# Patient Record
Sex: FEMALE | Race: WHITE | Employment: OTHER | URBAN - NONMETROPOLITAN AREA
[De-identification: names, ages, dates, MRNs, and addresses within clinical notes are randomized per-mention and may not be internally consistent; named-entity substitution may affect disease eponyms.]

---

## 2019-11-26 ENCOUNTER — OFFICE VISIT (OUTPATIENT)
Dept: OPTOMETRY | Age: 74
End: 2019-11-26
Payer: MEDICARE

## 2019-11-26 DIAGNOSIS — H52.203 MYOPIA OF BOTH EYES WITH ASTIGMATISM AND PRESBYOPIA: ICD-10-CM

## 2019-11-26 DIAGNOSIS — Z86.69 H/O DIPLOPIA: Primary | ICD-10-CM

## 2019-11-26 DIAGNOSIS — H53.8 BLURRED VISION, BILATERAL: ICD-10-CM

## 2019-11-26 DIAGNOSIS — H25.13 NUCLEAR SCLEROSIS OF BOTH EYES: ICD-10-CM

## 2019-11-26 DIAGNOSIS — H52.13 MYOPIA OF BOTH EYES WITH ASTIGMATISM AND PRESBYOPIA: ICD-10-CM

## 2019-11-26 DIAGNOSIS — H52.4 MYOPIA OF BOTH EYES WITH ASTIGMATISM AND PRESBYOPIA: ICD-10-CM

## 2019-11-26 PROCEDURE — 4040F PNEUMOC VAC/ADMIN/RCVD: CPT | Performed by: OPTOMETRIST

## 2019-11-26 PROCEDURE — G8484 FLU IMMUNIZE NO ADMIN: HCPCS | Performed by: OPTOMETRIST

## 2019-11-26 PROCEDURE — 99203 OFFICE O/P NEW LOW 30 MIN: CPT | Performed by: OPTOMETRIST

## 2019-11-26 PROCEDURE — G8421 BMI NOT CALCULATED: HCPCS | Performed by: OPTOMETRIST

## 2019-11-26 PROCEDURE — 1123F ACP DISCUSS/DSCN MKR DOCD: CPT | Performed by: OPTOMETRIST

## 2019-11-26 PROCEDURE — 1036F TOBACCO NON-USER: CPT | Performed by: OPTOMETRIST

## 2019-11-26 PROCEDURE — G8427 DOCREV CUR MEDS BY ELIG CLIN: HCPCS | Performed by: OPTOMETRIST

## 2019-11-26 PROCEDURE — G8400 PT W/DXA NO RESULTS DOC: HCPCS | Performed by: OPTOMETRIST

## 2019-11-26 PROCEDURE — 1090F PRES/ABSN URINE INCON ASSESS: CPT | Performed by: OPTOMETRIST

## 2019-11-26 RX ORDER — GLIPIZIDE 5 MG/1
TABLET, FILM COATED, EXTENDED RELEASE ORAL
COMMUNITY
Start: 2019-11-03 | End: 2022-11-02

## 2019-11-26 ASSESSMENT — REFRACTION_WEARINGRX
OD_ADD: +2.50
SPECS_TYPE: PAL
OS_CYLINDER: -1.75
OD_SPHERE: -0.75
OS_ADD: +2.50
OD_CYLINDER: -2.00
OS_AXIS: 017
OS_SPHERE: +0.00
OD_AXIS: 174

## 2019-11-26 ASSESSMENT — TONOMETRY
OS_IOP_MMHG: 15
OD_IOP_MMHG: 16
IOP_METHOD: NON-CONTACT AIR PUFF

## 2019-11-26 ASSESSMENT — REFRACTION_MANIFEST
OS_ADD: +2.50
OS_CYLINDER: -1.75
OS_SPHERE: +0.25
OD_AXIS: 174
OS_AXIS: 020
OD_CYLINDER: -2.00
OD_ADD: +2.50
OD_SPHERE: -0.75

## 2019-11-26 ASSESSMENT — SLIT LAMP EXAM - LIDS
COMMENTS: NORMAL
COMMENTS: NORMAL

## 2019-11-26 ASSESSMENT — ENCOUNTER SYMPTOMS
EYES NEGATIVE: 0
GASTROINTESTINAL NEGATIVE: 0
RESPIRATORY NEGATIVE: 0
ALLERGIC/IMMUNOLOGIC NEGATIVE: 0

## 2019-11-26 ASSESSMENT — VISUAL ACUITY
CORRECTION_TYPE: GLASSES
OD_CC: 20/30 OU
OS_CC: 20/40
METHOD: SNELLEN - LINEAR
OS_CC+: -2

## 2020-01-02 ENCOUNTER — OFFICE VISIT (OUTPATIENT)
Dept: OPTOMETRY | Age: 75
End: 2020-01-02
Payer: MEDICARE

## 2020-01-02 PROCEDURE — G8427 DOCREV CUR MEDS BY ELIG CLIN: HCPCS | Performed by: OPTOMETRIST

## 2020-01-02 PROCEDURE — 99213 OFFICE O/P EST LOW 20 MIN: CPT | Performed by: OPTOMETRIST

## 2020-01-02 PROCEDURE — 1090F PRES/ABSN URINE INCON ASSESS: CPT | Performed by: OPTOMETRIST

## 2020-01-02 PROCEDURE — 1036F TOBACCO NON-USER: CPT | Performed by: OPTOMETRIST

## 2020-01-02 PROCEDURE — G8484 FLU IMMUNIZE NO ADMIN: HCPCS | Performed by: OPTOMETRIST

## 2020-01-02 PROCEDURE — 1123F ACP DISCUSS/DSCN MKR DOCD: CPT | Performed by: OPTOMETRIST

## 2020-01-02 PROCEDURE — 92250 FUNDUS PHOTOGRAPHY W/I&R: CPT | Performed by: OPTOMETRIST

## 2020-01-02 PROCEDURE — 4040F PNEUMOC VAC/ADMIN/RCVD: CPT | Performed by: OPTOMETRIST

## 2020-01-02 PROCEDURE — G8421 BMI NOT CALCULATED: HCPCS | Performed by: OPTOMETRIST

## 2020-01-02 PROCEDURE — G8400 PT W/DXA NO RESULTS DOC: HCPCS | Performed by: OPTOMETRIST

## 2020-01-02 RX ORDER — PHENYLEPHRINE HCL 2.5 %
1 DROPS OPHTHALMIC (EYE) ONCE
Status: COMPLETED | OUTPATIENT
Start: 2020-01-02 | End: 2020-01-02

## 2020-01-02 RX ORDER — TROPICAMIDE 10 MG/ML
1 SOLUTION/ DROPS OPHTHALMIC ONCE
Status: COMPLETED | OUTPATIENT
Start: 2020-01-02 | End: 2020-01-02

## 2020-01-02 RX ADMIN — TROPICAMIDE 1 DROP: 10 SOLUTION/ DROPS OPHTHALMIC at 16:27

## 2020-01-02 RX ADMIN — Medication 1 DROP: at 16:26

## 2020-01-02 ASSESSMENT — VISUAL ACUITY
OS_CC+: -1
METHOD: SNELLEN - LINEAR
OD_CC+: -1
OD_CC: 20/30 OU
OS_CC: 20/20
CORRECTION_TYPE: GLASSES

## 2020-01-02 ASSESSMENT — ENCOUNTER SYMPTOMS
ALLERGIC/IMMUNOLOGIC NEGATIVE: 0
RESPIRATORY NEGATIVE: 0
GASTROINTESTINAL NEGATIVE: 0
EYES NEGATIVE: 0

## 2020-01-02 ASSESSMENT — REFRACTION_WEARINGRX
OS_AXIS: 020
SPECS_TYPE: PAL
OD_AXIS: 174
OD_ADD: +2.50
OD_SPHERE: -0.75
OS_ADD: +2.50
OD_CYLINDER: -2.00
OS_SPHERE: +0.25
OS_CYLINDER: -1.75

## 2020-01-02 ASSESSMENT — TONOMETRY
IOP_METHOD: NON-CONTACT AIR PUFF
OS_IOP_MMHG: 15
OD_IOP_MMHG: 11

## 2020-01-02 ASSESSMENT — SLIT LAMP EXAM - LIDS
COMMENTS: NORMAL
COMMENTS: NORMAL

## 2020-01-02 NOTE — PROGRESS NOTES
Transportation needs:     Medical: None     Non-medical: None   Tobacco Use    Smoking status: Never Smoker    Smokeless tobacco: Never Used   Substance and Sexual Activity    Alcohol use: None    Drug use: None    Sexual activity: None   Lifestyle    Physical activity:     Days per week: None     Minutes per session: None    Stress: None   Relationships    Social connections:     Talks on phone: None     Gets together: None     Attends [employer] service: None     Active member of club or organization: None     Attends meetings of clubs or organizations: None     Relationship status: None    Intimate partner violence:     Fear of current or ex partner: None     Emotionally abused: None     Physically abused: None     Forced sexual activity: None   Other Topics Concern    None   Social History Narrative    None       Past Medical History:   Diagnosis Date    Hypertension     Type II or unspecified type diabetes mellitus without mention of complication, not stated as uncontrolled          Main Ophthalmology Exam     External Exam       Right Left    External Normal Normal          Slit Lamp Exam       Right Left    Lids/Lashes Normal Normal    Conjunctiva/Sclera White and quiet White and quiet    Cornea Clear Clear    Anterior Chamber Deep and quiet Deep and quiet    Iris Round and reactive Round and reactive    Lens 2+ Nuclear sclerosis 2+ Nuclear sclerosis    Vitreous Central vitreous floaters Central vitreous floaters          Fundus Exam       Right Left    Disc Normal Normal    C/D Ratio 0.3 0.3    Macula Normal Normal    Vessels Normal Normal                  Tonometry     Tonometry (Non-contact air puff, 3:53 PM)       Right Left    Pressure 11 15   IOP.2             12.9  CH:  11.3          9.6  WS: 9.0          9.3                   Visual Acuity (Snellen - Linear)       Right Left    Dist cc 20/20 -1 20/20 -1    Near cc 20/30 OU     Correction:  Glasses         Not recorded         Not

## 2021-03-03 ENCOUNTER — OFFICE VISIT (OUTPATIENT)
Dept: NEUROLOGY | Age: 76
End: 2021-03-03
Payer: MEDICARE

## 2021-03-03 VITALS
HEIGHT: 67 IN | HEART RATE: 66 BPM | DIASTOLIC BLOOD PRESSURE: 80 MMHG | WEIGHT: 227 LBS | OXYGEN SATURATION: 97 % | BODY MASS INDEX: 35.63 KG/M2 | SYSTOLIC BLOOD PRESSURE: 118 MMHG

## 2021-03-03 DIAGNOSIS — Z79.01 CURRENT USE OF LONG TERM ANTICOAGULATION: ICD-10-CM

## 2021-03-03 DIAGNOSIS — E11.3299 BACKGROUND DIABETIC RETINOPATHY (HCC): ICD-10-CM

## 2021-03-03 DIAGNOSIS — H52.203 MYOPIA OF BOTH EYES WITH ASTIGMATISM AND PRESBYOPIA: ICD-10-CM

## 2021-03-03 DIAGNOSIS — I48.91 ATRIAL FIBRILLATION, UNSPECIFIED TYPE (HCC): ICD-10-CM

## 2021-03-03 DIAGNOSIS — I67.82 CEREBRAL ISCHEMIA: ICD-10-CM

## 2021-03-03 DIAGNOSIS — G62.9 PERIPHERAL POLYNEUROPATHY: ICD-10-CM

## 2021-03-03 DIAGNOSIS — R42 DIZZINESS: Primary | ICD-10-CM

## 2021-03-03 DIAGNOSIS — E11.49 CONTROLLED TYPE 2 DIABETES MELLITUS WITH OTHER NEUROLOGIC COMPLICATION, WITHOUT LONG-TERM CURRENT USE OF INSULIN (HCC): ICD-10-CM

## 2021-03-03 DIAGNOSIS — R26.89 BALANCE PROBLEM: ICD-10-CM

## 2021-03-03 DIAGNOSIS — R42 VERTIGO: ICD-10-CM

## 2021-03-03 DIAGNOSIS — H52.13 MYOPIA OF BOTH EYES WITH ASTIGMATISM AND PRESBYOPIA: ICD-10-CM

## 2021-03-03 DIAGNOSIS — H52.4 MYOPIA OF BOTH EYES WITH ASTIGMATISM AND PRESBYOPIA: ICD-10-CM

## 2021-03-03 PROBLEM — E11.9 DIABETES MELLITUS TYPE II, CONTROLLED (HCC): Status: ACTIVE | Noted: 2021-03-03

## 2021-03-03 PROCEDURE — 99214 OFFICE O/P EST MOD 30 MIN: CPT | Performed by: PSYCHIATRY & NEUROLOGY

## 2021-03-03 PROCEDURE — G8484 FLU IMMUNIZE NO ADMIN: HCPCS | Performed by: PSYCHIATRY & NEUROLOGY

## 2021-03-03 PROCEDURE — 99205 OFFICE O/P NEW HI 60 MIN: CPT | Performed by: PSYCHIATRY & NEUROLOGY

## 2021-03-03 PROCEDURE — G8427 DOCREV CUR MEDS BY ELIG CLIN: HCPCS | Performed by: PSYCHIATRY & NEUROLOGY

## 2021-03-03 PROCEDURE — 1090F PRES/ABSN URINE INCON ASSESS: CPT | Performed by: PSYCHIATRY & NEUROLOGY

## 2021-03-03 PROCEDURE — G8417 CALC BMI ABV UP PARAM F/U: HCPCS | Performed by: PSYCHIATRY & NEUROLOGY

## 2021-03-03 RX ORDER — LORAZEPAM 1 MG/1
TABLET ORAL
Qty: 2 TABLET | Refills: 0 | Status: SHIPPED | OUTPATIENT
Start: 2021-03-03 | End: 2021-04-03

## 2021-03-03 RX ORDER — WARFARIN SODIUM 2 MG/1
2 TABLET ORAL
COMMUNITY

## 2021-03-03 ASSESSMENT — ENCOUNTER SYMPTOMS
VOICE CHANGE: 0
EYE PAIN: 0
CONSTIPATION: 0
EYE ITCHING: 0
EYE REDNESS: 0
PHOTOPHOBIA: 0
SHORTNESS OF BREATH: 0
DIARRHEA: 0
CHANGE IN BOWEL HABIT: 0
FACIAL SWELLING: 0
SORE THROAT: 0
VISUAL CHANGE: 0
NAUSEA: 0
VOMITING: 0
SINUS PRESSURE: 0
APNEA: 0
CHOKING: 0
ABDOMINAL PAIN: 0
BLOOD IN STOOL: 0
EYE DISCHARGE: 0
COUGH: 0
SWOLLEN GLANDS: 0
WHEEZING: 0
ABDOMINAL DISTENTION: 0
BOWEL INCONTINENCE: 0
COLOR CHANGE: 0
TROUBLE SWALLOWING: 0
CHEST TIGHTNESS: 0
BACK PAIN: 0

## 2021-03-03 NOTE — PROGRESS NOTES
[facility]  Neurology    [address]ZQU:[phone]   Fax: [phone]        SUBJECTIVE:       PATIENT ID:  Irasema Machado is a  RIGHT    HANDED 64 y.o. female. Dizziness  This is a recurrent problem. Episode onset: SINCE   FEB. 2021. The problem occurs intermittently. The problem has been waxing and waning. Associated symptoms include numbness and vertigo. Pertinent negatives include no abdominal pain, anorexia, arthralgias, change in bowel habit, chest pain, chills, congestion, coughing, diaphoresis, fatigue, fever, headaches, joint swelling, myalgias, nausea, neck pain, rash, sore throat, swollen glands, urinary symptoms, visual change, vomiting or weakness. Nothing aggravates the symptoms. She has tried rest and sleep for the symptoms. The treatment provided moderate relief. Neurologic Problem  The patient's primary symptoms include clumsiness, focal sensory loss and a loss of balance. The patient's pertinent negatives include no altered mental status, focal weakness, memory loss, near-syncope, syncope, visual change or weakness. Primary symptoms comment: H/O   VERTIGO    IN     2019  . This is a chronic problem. The neurological problem developed insidiously. The problem is unchanged. There was lower extremity, left-sided and right-sided focality noted. Associated symptoms include dizziness, light-headedness and vertigo. Pertinent negatives include no abdominal pain, auditory change, aura, back pain, bladder incontinence, bowel incontinence, chest pain, confusion, diaphoresis, fatigue, fever, headaches, nausea, neck pain, palpitations, shortness of breath or vomiting. Past treatments include bed rest and sleep. The treatment provided no relief. There is no history of a bleeding disorder, a clotting disorder, a CVA, dementia, head trauma, liver disease, mood changes or seizures.              History obtained from  The patient     and other  available CORRECTABLE   ETIOLOGIES;     AND                              PATIENT  REQUESTS   THE  SAME. 9)       VARIOUS  RISK   FACTORS   WERE  REVIEWED   AND   DISCUSSED. PATIENT   HAS  MULTIPLE   MEDICAL, NEUROLOGICAL     PROBLEMS . PATIENT'S   MANAGEMENT  IS  CHALLENGING.                                             PRECIPITATING  FACTORS: including  fever/infection, exertion/relaxation, position change, stress,                weather change,   medications/alcohol, time of day/darkness/light  Are  absent                                                          MODIFYING  FACTORS:  fever/infection, exertion/relaxation, position change, stress, weather change,               medications/alcohol, time of day/darkness/light  Are  absent                Patient   Indicates   The  Presence   And  The  Absence  Of  The  Following    Associated  And             Additional  Neurological    Symptoms:                                Balance  And coordination   problems  present           Gait problems     absent            Headaches      absent              Migraines           absent           Memory problemsabsent              Confusion        absent            Paresthesia   numbness          present           Seizures  And  Starring  Episodes           absent           Syncope,  Near  syncopal episodes         absent           Speech   problems           absent             Swallowing   Problems      absent            Dizziness,  Light headedness           present              Vertigo        present             Generalized   Weakness    absent              focal  Weakness     absent             Tremors         absent              Sleep  Problems     absent             History  Of   Recent  Head  Injury     absent             History  Of   Recent  TIA     absent             History  Of   Recent    Stroke     absent             Neck  Pain   and   Neck muscle  Spasms  absent               Radiating  down   And   Weakness           absent            Lower back   Pain  And     Spasms  absent              Radiating    Down   And   Weakness          absent                H/OFALLS        absent               History  Of   Visual  Symptoms    absent                  Associated   Diplopia       absent                                               Also   Additional   Symptoms   Present    As  Documented    In   The   detailed                  Review  Of  Systems   And    Please   Refer   To    Them for   Additional    Information. Any components  That are either  Unobtainable  Or  Limited  In   HPI, ROS  And/or PFSH   Are                   Due   ToPatient's  Medical  Problems,  Clinical  Condition   and/or lack of                                 other    Alternate   resources. RECORDS   REVIEWED:    historical medical records           INFORMATION   REVIEWED:     MEDICAL   HISTORY,SURGICAL   HISTORY,     MEDICATIONS   LIST,   ALLERGIES AND  DRUG  INTOLERANCES,       FAMILY   HISTORY,  SOCIAL  HISTORY,      PROBLEM  LIST   FOR  PATIENT  CARE   COORDINATION          Past Medical History:   Diagnosis Date    Hypertension     Type II or unspecified type diabetes mellitus without mention of complication, not stated as uncontrolled          History reviewed. No pertinent surgical history.       Current Outpatient Medications   Medication Sig Dispense Refill    warfarin (COUMADIN) 2 MG tablet Take 2 mg by mouth As directed      LORazepam (ATIVAN) 1 MG tablet Take 1 to 2 tabs PO PRN 1 Hour prior to procedure    NO    DRIVING   AFTER  TAKING    MEDICATION 2 tablet 0    glipiZIDE (GLUCOTROL XL) 5 MG extended release tablet       carvedilol (COREG) 6.25 MG tablet Take by mouth 2 times daily       famotidine (PEPCID) 20 MG tablet Take 20 mg by mouth 2 times daily       furosemide (LASIX) 20 MG tablet Take 20 mg by mouth daily       metFORMIN (GLUCOPHAGE) 1000 MG tablet Take 1,000 mg by mouth 2 times daily (with meals)       spironolactone (ALDACTONE) 25 MG tablet Take 25 mg by mouth daily       warfarin (COUMADIN) 2.5 MG tablet Take 2.5 mg by mouth daily \"6 days a week\"      digoxin (LANOXIN) 250 MCG tablet Take 250 mcg by mouth daily \"5 times a week, skipping 2 days\"       No current facility-administered medications for this visit.           No Known Allergies      Family History   Problem Relation Age of Onset    Other Mother         detached retina ou    Glaucoma Neg Hx     Cataracts Neg Hx     Diabetes Neg Hx          Social History     Socioeconomic History    Marital status:      Spouse name: Not on file    Number of children: Not on file    Years of education: Not on file    Highest education level: Not on file   Occupational History    Not on file   Social Needs    Financial resource strain: Not on file    Food insecurity     Worry: Not on file     Inability: Not on file    Transportation needs     Medical: Not on file     Non-medical: Not on file   Tobacco Use    Smoking status: Never Smoker    Smokeless tobacco: Never Used   Substance and Sexual Activity    Alcohol use: Not on file    Drug use: Not on file    Sexual activity: Not on file   Lifestyle    Physical activity     Days per week: Not on file     Minutes per session: Not on file    Stress: Not on file   Relationships    Social connections     Talks on phone: Not on file     Gets together: Not on file     Attends Christianity service: Not on file     Active member of club or organization: Not on file     Attends meetings of clubs or organizations: Not on file     Relationship status: Not on file    Intimate partner violence     Fear of current or ex partner: Not on file     Emotionally abused: Not on file     Physically abused: Not on file     Forced sexual activity: Not on file   Other Topics Concern    Not on file   Social History Narrative    Not on file Vitals:    03/03/21 0950   BP: 118/80   Pulse: 66   SpO2: 97%         Wt Readings from Last 3 Encounters:   03/03/21 227 lb (103 kg)         BP Readings from Last 3 Encounters:   03/03/21 118/80             Review of Systems   Constitutional: Negative for appetite change, chills, diaphoresis, fatigue, fever and unexpected weight change. HENT: Negative for congestion, dental problem, drooling, ear discharge, ear pain, facial swelling, hearing loss, mouth sores, nosebleeds, postnasal drip, sinus pressure, sore throat, tinnitus, trouble swallowing and voice change. Eyes: Negative for photophobia, pain, discharge, redness, itching and visual disturbance. Respiratory: Negative for apnea, cough, choking, chest tightness, shortness of breath and wheezing. Cardiovascular: Negative for chest pain, palpitations, leg swelling and near-syncope. Gastrointestinal: Negative for abdominal distention, abdominal pain, anorexia, blood in stool, bowel incontinence, change in bowel habit, constipation, diarrhea, nausea and vomiting. Endocrine: Negative for cold intolerance, heat intolerance, polydipsia, polyphagia and polyuria. Genitourinary: Negative for bladder incontinence. Musculoskeletal: Negative for arthralgias, back pain, gait problem, joint swelling, myalgias, neck pain and neck stiffness. Skin: Negative for color change, pallor, rash and wound. Allergic/Immunologic: Negative for environmental allergies, food allergies and immunocompromised state. Neurological: Positive for dizziness, vertigo, light-headedness, numbness and loss of balance. Negative for tremors, focal weakness, seizures, syncope, facial asymmetry, speech difficulty, weakness and headaches. Hematological: Negative for adenopathy. Does not bruise/bleed easily.    Psychiatric/Behavioral: Negative for agitation, behavioral problems, confusion, decreased concentration, dysphoric mood, hallucinations, memory loss, self-injury, sleep MENTAL STATUS:                   Alert and  oriented  To time, place  And  Person. No Aphasia. No  Dysarthria. Able   To  Follow     SIMPLE    commands   without   Any  Difficulty. No right  To left confusion. Normal  Speech  And language function. Insight and  Judgment ,Fund  Of  Knowledge   within normal limits                Recent  And  Remote memory  within   normal limits                Attention &  Concentration are within   normal limits                                                   B) CRANIAL NERVES :        CN : Visual  Acuity  And  Visual fields  within normal limits               Fundi  Could  Not  Be  Could  Not  Be  Evaluated. 3,4,6 CN : Both  Pupils are   Reactive and  Equal.  Movements  Are  Intact. No  Nystagmus. No  BERTO. No  Afferent  Pupillary  Defect noted. 5 CN :  Normal  Facial sensations and Corneal  Reflexes           7 CN:  Normal  Facial  Symmetry  And  Strength. No facial  Weakness. 8 CN :  Hearing  Appears within normal limits          9, 10 CN: Normal   spontaneous, reflex   palate   movements         11 CN:   Normal  Shoulder  shrug and  strength         12 CN :   Normal  Tongue movements and  Tongue  In midline                        No tongue   Fasciculations or atrophy       C) MOTOR  EXAM:                 Strength  In upper  And  Lower   extremities   within   normal limits               No  Drift. No  Atrophy               Rapid   alternating  And  repetitions  Movements  within   normal limits                 Muscle  Tone  In upper  And  Lower  Extremities  normal                No rigidity. No  Spasticity. Bradykinesia   absent                 No  Asterixis.               Sustention  Tremor , Resting   Tremor   absent                    No   other  Abnormal  Movements noted           D) SENSORY : Light   touch, pinprick,   position  And  Vibration  DECREASED         E) REFLEXES:                   Deep  Tendon  Reflexes      DECREASED                    No  pathological  Reflexes  Bilaterally. F) COORDINATION  AND  GAIT :                                Station and  Gait     SLOW                             Romberg 's test    POSITIVE                            Ataxia negative      ASSESSMENT:        Patient Active Problem List   Diagnosis    Background [facility] [address]    Cataract    Myopia of both eyes with astigmatism and presbyopia    Dizziness    Vertigo    Balance problem    Peripheral polyneuropathy    Diabetes mellitus type II, controlled ([address]    Atrial fibrillation ([address]    Current use of long term anticoagulation    Cerebral ischemia         VISITING DIAGNOSIS:      ICD-10-CM    1. Dizziness  R42 LORazepam (ATIVAN) 1 MG tablet   2. Background diabetic retinopathy ([address]  E11.3299 EEG     MRI BRAIN WO CONTRAST     VL DUP CAROTID BILATERAL     VL TRANSCRANIAL DOPPLER COMPLETE     [facility], [place]     LORazepam (ATIVAN) 1 MG tablet   3. Myopia of both eyes with astigmatism and presbyopia  H52.13 EEG    H52.203 MRI BRAIN WO CONTRAST    H52.4 VL DUP CAROTID BILATERAL     VL TRANSCRANIAL [address] [facility], [place]     LORazepam (ATIVAN) 1 MG tablet   4. Vertigo  R42 LORazepam (ATIVAN) 1 MG tablet   5. Balance problem  R26.89 LORazepam (ATIVAN) 1 MG tablet   6. Peripheral polyneuropathy  G62.9 LORazepam (ATIVAN) 1 MG tablet   7. Controlled type 2 diabetes mellitus with other neurologic complication, without long-term current use of insulin ([facility])  E11.49 LORazepam (ATIVAN) 1 MG tablet   8. Atrial fibrillation, unspecified type ([address]  I48.91    9. Current use of long term anticoagulation  Z79.01    10.  Cerebral ischemia  I67.82               CONCERNS   &   INCREASED   RISK   FOR         * TIA, CEREBRO  VASCULAR  ISCHEMIA, STROKE     *   DIZZINESS,   VERTEBROBASILAR  INSUFFICIENCY ,       *   ORTHOSTATIC  INTOLERANCE,    AUTONOMIC  NEUROPATHY        *   PERIPHERAL  NEUROPATHY      *     BALANCE PROBLMES   AND  FALL                VARIOUS  RISK   FACTORS   WERE  REVIEWED   AND   DISCUSSED. *  PATIENT   HAS  MULTIPLE   MEDICAL, NEUROLOGICAL    PROBLEMS            PATIENT'S   MANAGEMENT  IS  CHALLENGING. PLAN:                         Herlinda Max  Of  The  Diagnoses,  The  Management & Treatment  Options            AND    Care  plan  Were          Reviewed and   Discussed   With  patient. * Goals  And  Expectations  Of  The  Therapy  Discussed   And  Reviewed. *   Benefits   And   Side  Effect  Profile  Of  Medication/s   Were   Discussed                * Need   For  Further   Follow up For  The  Various  Problems Were  discussed. * Results  Of  The  Previous  Diagnostic tests were reviewed and  discussed                   Medical  Decision  Making  Was  Made  Based on the   Complexity  Of  Above  Mentioned  Diagnoses,    Data reviewed             And    Risk  Of  Significant   Co morbidities and   complicating   Factors. Medical  Decision  Was   High    Complexity   Due   To  The  Patient's  Multiple  Symptoms,  Advancing   Disease,            Complex  Treatment  Regimen,  Multiple medications           and   Risk  Of   Side  Effects,  Difficulty  In  Medication  Management  And  Diagnostic  Challenges           In  View  Of  The  Associated   Co  Morbid  Conditions   And  Problems. * FALL   PRECAUTIONS. THESE  REVIEWED   AND  DISCUSSED          *   BE  CAREFUL  WITH  ACTIVITIES   INCLUDING  DRIVING. *   ADEQUATE   FLUID  INTAKE   AND  ELECTROLYTE  BALANCE           * KEEP  DAIRY  OF   THE  NEUROLOGICAL  SYMPTOMS        RECORDING THE    DURATION  AND  FREQUENCY.           *  AVOID    CONDITIONS  AND  FACTORS THAT  MAKE                  NEUROLOGICAL  SYMPTOMS  WORSE.                   *TO  MAINTAIN  REGULAR  SLEEP  WAKE  CYCLES. *   TO  HAVE  ADEQUATE  REST  AND   SLEEP    HOURS.              *    AVOID  ANY USAGE OF    TOBACCO,          AVOID  EXCESSIVE  ALCOHOL  AND   ILLEGAL   SUBSTANCES              *  CONTINUE   MEDICATIONS    PRESCRIBED       AS    RECOMMENDED       *   Compliance   With  Medications   And  Instructions          *    Prophylactic  Use   Of     Vitamin   B   Complex,  Folic  Acid,    Vitamin  B12    Multivitamin,       Calcium  With  magnesium  And  Vit D    Supplementations   Over  The  Counter  Discussed             *FOOT  CARE, DAILY  INSPECTION  OF  FEET   AND         PERIODIC  PODIATRY EVALUATIONS . *  EVALUATIONS  AND  FOLLOW UP:                   * PHYSICAL  THERAPY                                  *CARDIOLOGY              *   OPTHALMOLOGY                                                           *   IN  VIEW  OF  THE  PATIENT'S    MULTIPLE   NEUROLOGICAL                           SYMPTOMS  AND  CONCERNS    FOR  PROLONGED   DURATION,                           AND    MULTIPLE   CO MORBID  MEDICAL  CONDITIONS,                           PATIENT    NEEDS  NEURO  DIAGNOSTIC  EVALUATIONS                      FOR   ANY   NEUROLOGICAL  PATHOLOGIES    AND  OTHER                        CORRECTABLE   ETIOLOGIES;     AND                              PATIENT  REQUESTS   THE  SAME.                                            Orders Placed This Encounter   Procedures    MRI BRAIN WO CONTRAST    VL DUP CAROTID BILATERAL    VL TRANSCRANIAL DOPPLER COMPLETE    [facility] [place]    EEG                             Orders Placed This Encounter   Medications    LORazepam (ATIVAN) 1 MG tablet     Sig: Take 1 to 2 tabs PO PRN 1 Hour prior to procedure    NO    DRIVING   AFTER  TAKING    MEDICATION     Dispense:  2 tablet     Refill:  0                             * PATIENT  TO  RETURN  THE  CLINIC  AFTER   ABOVE,                       FOR   FURTHER    RE EVALUATIONS                               AND  ADDITIONAL  RECOMMENDATIONS ,                        INCLUDING  MEDICAL  MANAGEMENT,                        AS   CLINICALLY    INDICATED. *PATIENT   TO  FOLLOW  UP  WITH   PRIMARY  CARE         OTHER  CONSULTANTS  AS  BEFORE. *  Maintain   Healthy  Life Style    With   Periodic  Monitoring  Of          Any  Medical  Conditions  Including   Elevated  Blood  Pressure,  Lipid  Profile,        Blood  Sugar levels  AndHeart  Disease. *   Period   Screening  For  Cancers  Involving  Breast,  Colon,         Lungs  And  Other  Organs  As  Applicable,  In consultation   With  Your  Primary Care Providers. *Second  Neurological  Opinion  And  Evaluations  In  [facility] AND [facility]  Setting  If  Patient  Is  Interested. * Please   Contact   Neurology  Clinic   Early   If   Are  Any  New  Neurological   And  Any neurological  Concerns. *  If  The  Patient remains  Neurologically  Stable   Return   To  [facility] Neurology Department   IN     1-2      MONTHS  TIME   FOR  FURTHER              FOLLOW UP.                       *   The  Neurological   Findings,  Possible  Diagnosis,  Differential diagnoses                    And  Options  For    Further   Investigations                   And  management   Are  Discussed  Comprehensively. Medications   And  Prescription   Risks  And  Side effects  Are   Also  Discussed. *  If   There is  Any  Significant  Worsening   Of  Current  Symptoms  And  Or                  If patient  Develops   Any additional  New  NeurologicalSymptoms                  Or  Significant  Concerns   Should  Call  911 or  Go  To  Emergency  Department  For  Further  Immediate  Evaluation.                          The Above  Were  Reviewed  With  patient   and                       questions  Answered  In  Detail. More   Than  50% of face  To face Time   Was  Spent  On  Counseling                    And   Coordination  Of  Care   Of   Patient's  multiple   Neurological  Problems                         And   Comorbid  Medical   Conditions. Electronically signed by   Casey Espinoza M.D., Romi Rios. Board Certified in  Neurology &  In  [facility] ([facility])      DISCLAIMER:   Although every effort was made to ensure the accuracy of this  electronictranscription, some errors in transcription may have occurred. GENERAL PATIENT INSTRUCTIONS:      A Healthy Lifestyle: Care Instructions   Your Care Instructions   A healthy lifestyle can help you feel good, stay at ahealthy weight, and have plenty of energy for both work and play. A healthy lifestyle is something you can share with your whole family.  A healthy lifestyle also can lower your risk for serious health problems, such ashigh blood pressure, heart disease, and diabetes.  You can follow a few steps listed below to improve your health and the health of your family.  Follow-up careis a key part of your treatment and safety. Be sure to make and go to all appointments, and call your doctor if you are having problems. Its also a good idea to know your test results and keep a list of the medicines you take.  How can you care for yourself at home?  Do not eat too much sugar, fat, or fast foods. You can still have dessert and treats nowand then. The goal is moderation.  Start small to improve your eating habits. Pay attention to portion sizes, drink less juice and soda pop, and eat more fruits and vegetables.  Eat a healthy amount of food. A 3-ounce serving of meat, for example, is about the size of a deck of cards.  Fill the rest of your plate with vegetables and whole grains.  Limit theamount of soda and sports drinks you have every day. Drink more water when you are thirsty.  Eat at least 5 servings of fruits and vegetables every day. It may seem like a lot, but it is not hard to reach this goal. Aserving or helping is 1 piece of fruit, 1 cup of vegetables, or 2 cups of leafy, raw vegetables. Have an apple or some carrot sticks as an afternoon snack instead of a candy bar. Try to have fruits and/or vegetables at everymeal.   Make exercise part of your daily routine. You may want to start with simple activities, such as walking, bicycling, or slow swimming. Try eddie active 30 to 60 minutes every day. You do not need to do all 30 to 60 minutes all at once. For example, you can exercise 3 times a day for 10 or 20 minutes. Moderate exercise is safe for most people, but it is always agood idea to talk to your doctor before starting an exercise program.   Keep moving. Teryl Camper the lawn, work in the garden, or [employer]. Take the stairs instead of the elevator at work.  If you smoke, quit. Peoplewho smoke have an increased risk for heart attack, stroke, cancer, and other lung illnesses. Quitting is hard, but there are ways to boost your chance of quitting tobacco for good.  Use nicotine gum, patches, or lozenges.  Ask your doctor about stop-smoking programs and medicines.  Keep trying.  In addition to reducing your risk of diseases in the future, you will notice some benefits soon after you stop using tobacco. If you have shortness of breath or asthma symptoms, they will likely getbetter within a few weeks after you quit.  Limit how much alcohol you drink. Moderate amounts of alcohol (up to 2 drinks a day for men, 1drink a day for women) are okay. But drinking too much can lead to liver problems, high blood pressure, and other health problems.  health   If you have a family, there are many things you can do together to improve your health.    Eat meals together as a family as often as possible.  Eat healthy foods. This includes fruits, vegetables, lean meats and dairy, and whole grains.  Include your family in your fitness plan. Most peoplethink of activities such as jogging or tennis as the way to fitness, but there are many ways you and your family can be more active. Anything that makes you breathe hard and gets your heart pumping is exercise. Here are sometips:   Walk to do errands or to take your child to school or the bus.  Go for a family bike ride after dinner instead of watching TV.  Where can you learn more?  Go toSTACK [email or web address]. org and sign in to your [employer] account. Enter G912 in the Search HealthInformation box to learn more about \"A Healthy Lifestyle: Care Instructions. \"     If you do not have anaccount, please click on the \"Sign Up Now\" link.  Current as of: July 26, 2016   Content Version: 11.2   © 3466-9527 [employer]. Care instructions adapted under license by [facility] ([facility]). If you have questions about a medical condition or this instruction, always ask your healthcare professional. [employer] disclaims any warranty or liability for your use of this information.

## 2021-03-03 NOTE — PROGRESS NOTES
This writer contacted scheduling to schedule the following testing: EEG, MRI Brain w/o contrast, Carotid Doppler and TCD - patient voiced understanding, will contact office with further needs.

## 2021-03-10 ENCOUNTER — HOSPITAL ENCOUNTER (OUTPATIENT)
Dept: MRI IMAGING | Age: 76
Discharge: HOME | End: 2021-03-12
Payer: MEDICARE

## 2021-03-10 ENCOUNTER — HOSPITAL ENCOUNTER (OUTPATIENT)
Dept: NEUROLOGY | Age: 76
Discharge: HOME | End: 2021-03-10
Payer: MEDICARE

## 2021-03-10 ENCOUNTER — HOSPITAL ENCOUNTER (OUTPATIENT)
Dept: INTERVENTIONAL RADIOLOGY/VASCULAR | Age: 76
Discharge: HOME | End: 2021-03-12
Payer: MEDICARE

## 2021-03-10 DIAGNOSIS — H52.203 MYOPIA OF BOTH EYES WITH ASTIGMATISM AND PRESBYOPIA: ICD-10-CM

## 2021-03-10 DIAGNOSIS — E11.49 CONTROLLED TYPE 2 DIABETES MELLITUS WITH OTHER NEUROLOGIC COMPLICATION, WITHOUT LONG-TERM CURRENT USE OF INSULIN (HCC): ICD-10-CM

## 2021-03-10 DIAGNOSIS — H52.13 MYOPIA OF BOTH EYES WITH ASTIGMATISM AND PRESBYOPIA: ICD-10-CM

## 2021-03-10 DIAGNOSIS — R26.89 BALANCE PROBLEM: ICD-10-CM

## 2021-03-10 DIAGNOSIS — E11.3299 BACKGROUND DIABETIC RETINOPATHY (HCC): ICD-10-CM

## 2021-03-10 DIAGNOSIS — H52.4 MYOPIA OF BOTH EYES WITH ASTIGMATISM AND PRESBYOPIA: ICD-10-CM

## 2021-03-10 DIAGNOSIS — I65.23 BILATERAL CAROTID ARTERY STENOSIS: ICD-10-CM

## 2021-03-10 DIAGNOSIS — G62.9 PERIPHERAL POLYNEUROPATHY: Primary | ICD-10-CM

## 2021-03-10 DIAGNOSIS — G62.9 PERIPHERAL POLYNEUROPATHY: ICD-10-CM

## 2021-03-10 DIAGNOSIS — R42 VERTIGO: ICD-10-CM

## 2021-03-10 DIAGNOSIS — R42 DIZZINESS: ICD-10-CM

## 2021-03-10 DIAGNOSIS — I48.91 ATRIAL FIBRILLATION, UNSPECIFIED TYPE (HCC): ICD-10-CM

## 2021-03-10 DIAGNOSIS — Z79.01 CURRENT USE OF LONG TERM ANTICOAGULATION: ICD-10-CM

## 2021-03-10 DIAGNOSIS — I67.9: ICD-10-CM

## 2021-03-10 DIAGNOSIS — G93.89 BRAIN DYSFUNCTION: ICD-10-CM

## 2021-03-10 DIAGNOSIS — I67.82 CEREBRAL ISCHEMIA: ICD-10-CM

## 2021-03-10 PROCEDURE — 95813 EEG EXTND MNTR 61-119 MIN: CPT

## 2021-03-10 PROCEDURE — 93886 INTRACRANIAL COMPLETE STUDY: CPT

## 2021-03-10 PROCEDURE — 93880 EXTRACRANIAL BILAT STUDY: CPT

## 2021-03-10 PROCEDURE — 70551 MRI BRAIN STEM W/O DYE: CPT

## 2021-03-10 PROCEDURE — 95957 EEG DIGITAL ANALYSIS: CPT

## 2021-03-10 NOTE — PROGRESS NOTES
EXTENDED EEG Completed with Dereje Analysis. PCP: Erica Ross MD    Ordering: Cas Goodwin Neurologist    Interpreting Physician: Alyssa Britton Neurologist    Technician: Rodger Lynn RN

## 2021-03-10 NOTE — PROGRESS NOTES
TCD Completed without Emboli Detection. PCP: Elsie Hankins MD    Ordering: Leila Espinoza. Christa Neurologist    Interpreting Physician: Leila Espinoza.  Meena Goodwin    Electronically signed by Kane Palmer RN on 3/10/2021 at 10:39 AM

## 2021-03-11 NOTE — PROCEDURES
[address] [place] [id number]                               Transcranial Doppler (TCD)        PATIENT NAME: Carl Camargo                       :        1945  MED REC NO:   [id number]                             ROOM:  ACCOUNT NO:   [de-identified]                           ADMIT DATE: 03/10/2021      PROVIDER:     Keisha Tirado MD, F.A. A. N    DATE OF STUDY:  03/10/2021        TECHNIQUE:  Transcranial Doppler study of intracranial arteries was  performed using [employer] equipment with digital Doppler technology, with  high resolution 250 Tolono M-mode display and Multigate Spectral Windows  and 2 MHz Doppler probes via temporal, suboccipital and orbital  approaches. CLINICAL DATA:        The patient is 64years old with a history of vertigo,  dizziness, balance problem, type 2 diabetes, atrial fibrillation,  cerebral ischemia. The purpose of the study is to evaluate for stroke, intracranial focal  stenosis, flow abnormalities, vertebrobasilar insufficiency evaluations. SUMMARY:       Anterior circulation could not be evaluated due to the absence  of the corresponding temporal windows bilaterally. Mean flow velocities in the right and left vertebral arteries and  basilar artery are low with elevated PI values. IMPRESSION:        1. Moderate vertebrobasilar stenosis. 2.  Anterior circulation could not be evaluated due to the absence        of the corresponding temporal windows bilaterally. Further clinical correlation and followup recommended. César Avendano MD, [facility]     Board Certified in Neurology  & in  Farhana Gonzalez [facility] ([facility]).       D: 2021 14:45:48       T: 2021 15:10:41     [id number]  Job#: [id number]     Doc#: [id number]    CC:    Carmella Keene MD (PCP)

## 2021-03-11 NOTE — PROCEDURES
[address] [address]                 [address] [place] [id number]                           ELECTROENCEPHALOGRAM (EEG) REPORT        PATIENT NAME: Jahaira Wilder                       :        1945  MED REC NO:   [id number]                             ROOM:  ACCOUNT NO:   [de-identified]                           ADMIT DATE: 03/10/2021      PROVIDER:     Viktor Espinosa MD, F.A. A. N    DATE OF STUDY:  03/10/2021      TECHNIQUE:  23 channels of EEG, 2 channels of EOG, 2 channel of EKG and  1 channel ground and 1 channel reference were recorded with [employer]  Software 32 Channel System utilizing the International 10/20 System  Protocol. Dereje detection and seizure analysis protocols were utilized and the  study was reviewed using the comprehensive EEG monitoring. Dereje detection trending allows to see at a glance timing of detected  seizure in the context of other changes in the EEG. Dereje detection  analysis automatically notes the most types of electrode artifacts  making trends easier to review and more representative of cerebral  activity. Dereje detection analysis also provides collection of trends  for monitoring and review including seizure probability, rhythmic  spectrogram left and right hemispheres, peak envelope, amplitude,  relative symmetry and suppression ratio. This is an extended EEG recorded for more than one hour. CLINICAL DATA:        The patient is 64years old with a history of dizziness,  vertigo, balance problems, atrial fibrillation, cerebral ischemia. The purpose of the study is to evaluate for associated seizure activity. BACKGROUND ACTIVITY:        While the patient was awake, the background  activity consisted of fairly-regulated, low-amplitude 10 to 11 Hz  waveforms seen over both cerebral hemispheres reacting to the eye  opening.         ACTIVATION PROCEDURES:      HYPERVENTILATION:  Hyperventilation was performed for 3 minutes. Patient was cooperative with good effort. Also Post-Hyperventilation  period was monitored closely. Hyperventilation:  Unremarkable. PHOTIC STIMULATION:  Photic stimulation was performed at the following  frequencies: 1 Hz, 3 Hz, 6 Hz, 9 Hz, 12 Hz, 15 Hz, 18 Hz, 21 Hz, 25 Hz,  30 Hz and patient tolerated well. Photic stimulation:  Mild bilateral symmetric driving response noted. SLEEP:  Stage I and stage II sleep were noted. EKG:  EKG showed normal sinus rhythm without any significant  abnormality. IMPRESSION:          No significant focal, lateralized or epileptiform features       noted during the current recording. Further clinical correlation and followup recommended. Angeline Hollingsworth MD, [address] [place] [address]     Board Certified in Neurology  & in  [id number] [facility] ([facility]).         D: 03/11/2021 14:44:16       T: 03/11/2021 15:01:03     [id number]  Job#: [id number]     Doc#: [id number]    CC:    Alfonzo Manzo MD (PCP)

## 2021-03-30 ENCOUNTER — OFFICE VISIT (OUTPATIENT)
Dept: CARDIOLOGY | Age: 76
End: 2021-03-30
Payer: MEDICARE

## 2021-03-30 VITALS
HEIGHT: 67 IN | DIASTOLIC BLOOD PRESSURE: 66 MMHG | BODY MASS INDEX: 35.63 KG/M2 | HEART RATE: 87 BPM | SYSTOLIC BLOOD PRESSURE: 118 MMHG | WEIGHT: 227 LBS

## 2021-03-30 DIAGNOSIS — R94.31 ABNORMAL ECG: ICD-10-CM

## 2021-03-30 DIAGNOSIS — I48.21 PERMANENT ATRIAL FIBRILLATION (HCC): Primary | ICD-10-CM

## 2021-03-30 DIAGNOSIS — R06.09 DYSPNEA ON EXERTION: ICD-10-CM

## 2021-03-30 DIAGNOSIS — I10 ESSENTIAL HYPERTENSION: ICD-10-CM

## 2021-03-30 PROBLEM — K21.9 GERD (GASTROESOPHAGEAL REFLUX DISEASE): Status: ACTIVE | Noted: 2021-03-30

## 2021-03-30 PROCEDURE — G8417 CALC BMI ABV UP PARAM F/U: HCPCS | Performed by: INTERNAL MEDICINE

## 2021-03-30 PROCEDURE — G8400 PT W/DXA NO RESULTS DOC: HCPCS | Performed by: INTERNAL MEDICINE

## 2021-03-30 PROCEDURE — 1123F ACP DISCUSS/DSCN MKR DOCD: CPT | Performed by: INTERNAL MEDICINE

## 2021-03-30 PROCEDURE — 1090F PRES/ABSN URINE INCON ASSESS: CPT | Performed by: INTERNAL MEDICINE

## 2021-03-30 PROCEDURE — 93010 ELECTROCARDIOGRAM REPORT: CPT | Performed by: INTERNAL MEDICINE

## 2021-03-30 PROCEDURE — 1036F TOBACCO NON-USER: CPT | Performed by: INTERNAL MEDICINE

## 2021-03-30 PROCEDURE — G8427 DOCREV CUR MEDS BY ELIG CLIN: HCPCS | Performed by: INTERNAL MEDICINE

## 2021-03-30 PROCEDURE — G8484 FLU IMMUNIZE NO ADMIN: HCPCS | Performed by: INTERNAL MEDICINE

## 2021-03-30 PROCEDURE — 99204 OFFICE O/P NEW MOD 45 MIN: CPT | Performed by: INTERNAL MEDICINE

## 2021-03-30 PROCEDURE — 99214 OFFICE O/P EST MOD 30 MIN: CPT | Performed by: INTERNAL MEDICINE

## 2021-03-30 PROCEDURE — 4040F PNEUMOC VAC/ADMIN/RCVD: CPT | Performed by: INTERNAL MEDICINE

## 2021-03-30 PROCEDURE — 93005 ELECTROCARDIOGRAM TRACING: CPT | Performed by: INTERNAL MEDICINE

## 2021-03-30 NOTE — PROGRESS NOTES
Today's Date: 3/30/2021  Patient's Name: Tootie Marino  Patient's age: 64 y.o., 1945    Subjective:  Tootie Marino  is here to establish cardiology care. She had issues with vertigo and was found to have labyrinthitis. She has history of atrial fibrillation for 25 years and has been on coumadin for this long. She denies any bleedings. She denies any falls. She denies any chest pain. She reports dyspnea with mild exertion. No PND, no syncope or pre-syncope, no orthopnea. Past Medical History:   has a past medical history of Hypertension and Type II or unspecified type diabetes mellitus without mention of complication, not stated as uncontrolled. Past Surgical History:   has no past surgical history on file. Home Medications:  Prior to Admission medications    Medication Sig Start Date End Date Taking?  Authorizing Provider   warfarin (COUMADIN) 2 MG tablet Take 2 mg by mouth As directed    Historical Provider, MD   LORazepam (ATIVAN) 1 MG tablet Take 1 to 2 tabs PO PRN 1 Hour prior to procedure    NO    DRIVING   AFTER  TAKING    MEDICATION 3/3/21 6/7/92  Huong Suarez MD   glipiZIDE (GLUCOTROL XL) 5 MG extended release tablet  11/3/19   Historical Provider, MD   carvedilol (COREG) 6.25 MG tablet Take by mouth 2 times daily  3/18/16   Historical Provider, MD   digoxin (LANOXIN) 250 MCG tablet Take 250 mcg by mouth daily \"5 times a week, skipping 2 days\" 2/15/16   Historical Provider, MD   famotidine (PEPCID) 20 MG tablet Take 20 mg by mouth 2 times daily  3/18/16   Historical Provider, MD   furosemide (LASIX) 20 MG tablet Take 20 mg by mouth daily  4/6/16   Historical Provider, MD   metFORMIN (GLUCOPHAGE) 1000 MG tablet Take 1,000 mg by mouth 2 times daily (with meals)  3/21/16   Historical Provider, MD   spironolactone (ALDACTONE) 25 MG tablet Take 25 mg by mouth daily  3/2/16   Historical Provider, MD   warfarin (COUMADIN) 2.5 MG tablet Take 2.5 mg by mouth daily \"6 days a week\" 3/2/16 Historical Provider, MD       Allergies:  Patient has no known allergies. Social History:   reports that she has never smoked. She has never used smokeless tobacco.     Family History: family history includes Other in her mother. No h/o sudden cardiac death. No for premature CAD    REVIEW OF SYSTEMS:    · Constitutional: there has been no unanticipated weight loss. There's been No change in energy level, No change in activity level. · Eyes: No visual changes or diplopia. No scleral icterus. · ENT: No Headaches, hearing loss or vertigo. No mouth sores or sore throat. · Cardiovascular: see above  · Respiratory: see above  · Gastrointestinal: No abdominal pain, appetite loss, blood in stools. · Genitourinary: No dysuria, trouble voiding, or hematuria. · Musculoskeletal:  Reports arthritis  · Integumentary: No rash or pruritis. · Neurological: No headache or diplopia. No tingling  · Psychiatric: No anxiety, or depression. · Endocrine: No temperature intolerance. · Hematologic/Lymphatic: No abnormal bruising or bleeding, blood clots or swollen lymph nodes. · Allergic/Immunologic: No nasal congestion or hives. PHYSICAL EXAM:      Vitals:    03/30/21 0900   BP: (!) 146/65   Pulse: 87       Constitutional and General Appearance: alert, cooperative, no distress and appears stated age  HEENT: PERRL, no cervical lymphadenopathy. No masses palpable. Normal oral mucosa  Respiratory:  · Normal excursion and expansion without use of accessory muscles  · Resp Auscultation: Good respiratory effort. No for increased work of breathing. On auscultation: clear to auscultation bilaterally  Cardiovascular:  · Heart tones are crisp and normal. regular S1 and S2.  · Jugular venous pulsation Normal  · The carotid upstroke is normal in amplitude and contour without delay or bruit   Abdomen:   · soft  · Bowel sounds present  Extremities:  ·  No edema  Neurological:  · Alert and oriented.     Cardiac Data:  EKG: Atrial fibrillation, NS ST-T changes    Labs:     CBC: No results for input(s): WBC, HGB, HCT, PLT in the last 72 hours. BMP: No results for input(s): NA, K, CO2, BUN, CREATININE, LABGLOM, GLUCOSE in the last 72 hours. PT/INR: No results for input(s): PROTIME, INR in the last 72 hours. FASTING LIPID PANEL:No results found for: HDL, LDLDIRECT, LDLCALC, TRIG  LIVER PROFILE:No results for input(s): AST, ALT, LABALBU in the last 72 hours. Assessment and plan:    -H/o permanent Afib for 25 years- on coumadin. On coreg and digoxin.  -Dyspnea with abnormal ECG. Will obtain TTE and lexiscan stress test.  -HTN- failry well controlled at this time. Continue current therapy.   -Obesity - encouraged diet, exercise, and discussed weight loss extensively. -DM-Management per PCP  -GERD- on famotidine.  -Vertigo-dizziness with any movement and eye movement. managed by neurology  -Labyrinthitis- 6/2020  -RTC 6 months.     Farhana Pradhan [id number]           [phone]

## 2021-04-07 ENCOUNTER — OFFICE VISIT (OUTPATIENT)
Dept: NEUROLOGY | Age: 76
End: 2021-04-07
Payer: MEDICARE

## 2021-04-07 VITALS
HEIGHT: 67 IN | HEART RATE: 101 BPM | BODY MASS INDEX: 35.47 KG/M2 | OXYGEN SATURATION: 95 % | DIASTOLIC BLOOD PRESSURE: 82 MMHG | SYSTOLIC BLOOD PRESSURE: 128 MMHG | WEIGHT: 226 LBS

## 2021-04-07 DIAGNOSIS — E11.49 CONTROLLED TYPE 2 DIABETES MELLITUS WITH OTHER NEUROLOGIC COMPLICATION, WITHOUT LONG-TERM CURRENT USE OF INSULIN (HCC): ICD-10-CM

## 2021-04-07 DIAGNOSIS — R26.89 BALANCE PROBLEM: ICD-10-CM

## 2021-04-07 DIAGNOSIS — I48.91 ATRIAL FIBRILLATION, UNSPECIFIED TYPE (HCC): ICD-10-CM

## 2021-04-07 DIAGNOSIS — R42 VERTIGO: ICD-10-CM

## 2021-04-07 DIAGNOSIS — I67.82 CEREBRAL ISCHEMIA: ICD-10-CM

## 2021-04-07 DIAGNOSIS — E11.3299 BACKGROUND DIABETIC RETINOPATHY (HCC): ICD-10-CM

## 2021-04-07 DIAGNOSIS — I65.21 CAROTID STENOSIS, ASYMPTOMATIC, RIGHT: ICD-10-CM

## 2021-04-07 DIAGNOSIS — G62.9 PERIPHERAL POLYNEUROPATHY: ICD-10-CM

## 2021-04-07 DIAGNOSIS — R42 DIZZINESS: Primary | ICD-10-CM

## 2021-04-07 DIAGNOSIS — K21.9 GASTROESOPHAGEAL REFLUX DISEASE, UNSPECIFIED WHETHER ESOPHAGITIS PRESENT: ICD-10-CM

## 2021-04-07 DIAGNOSIS — E11.21 CONTROLLED TYPE 2 DIABETES MELLITUS WITH DIABETIC NEPHROPATHY, WITHOUT LONG-TERM CURRENT USE OF INSULIN (HCC): ICD-10-CM

## 2021-04-07 DIAGNOSIS — Z79.01 CURRENT USE OF LONG TERM ANTICOAGULATION: ICD-10-CM

## 2021-04-07 DIAGNOSIS — G45.0 VBI (VERTEBROBASILAR INSUFFICIENCY): ICD-10-CM

## 2021-04-07 PROCEDURE — 4040F PNEUMOC VAC/ADMIN/RCVD: CPT | Performed by: PSYCHIATRY & NEUROLOGY

## 2021-04-07 PROCEDURE — 1123F ACP DISCUSS/DSCN MKR DOCD: CPT | Performed by: PSYCHIATRY & NEUROLOGY

## 2021-04-07 PROCEDURE — G8427 DOCREV CUR MEDS BY ELIG CLIN: HCPCS | Performed by: PSYCHIATRY & NEUROLOGY

## 2021-04-07 PROCEDURE — G8417 CALC BMI ABV UP PARAM F/U: HCPCS | Performed by: PSYCHIATRY & NEUROLOGY

## 2021-04-07 PROCEDURE — 1090F PRES/ABSN URINE INCON ASSESS: CPT | Performed by: PSYCHIATRY & NEUROLOGY

## 2021-04-07 PROCEDURE — 1036F TOBACCO NON-USER: CPT | Performed by: PSYCHIATRY & NEUROLOGY

## 2021-04-07 PROCEDURE — 99214 OFFICE O/P EST MOD 30 MIN: CPT | Performed by: PSYCHIATRY & NEUROLOGY

## 2021-04-07 PROCEDURE — G8400 PT W/DXA NO RESULTS DOC: HCPCS | Performed by: PSYCHIATRY & NEUROLOGY

## 2021-04-07 PROCEDURE — 99213 OFFICE O/P EST LOW 20 MIN: CPT | Performed by: PSYCHIATRY & NEUROLOGY

## 2021-04-07 ASSESSMENT — ENCOUNTER SYMPTOMS
COUGH: 0
VOMITING: 0
COLOR CHANGE: 0
NAUSEA: 0
ABDOMINAL PAIN: 0
BLOOD IN STOOL: 0
SHORTNESS OF BREATH: 0
EYE ITCHING: 0
CHANGE IN BOWEL HABIT: 0
BACK PAIN: 0
EYE REDNESS: 0
FACIAL SWELLING: 0
SWOLLEN GLANDS: 0
EYE DISCHARGE: 0
TROUBLE SWALLOWING: 0
SINUS PRESSURE: 0
CONSTIPATION: 0
VOICE CHANGE: 0
DIARRHEA: 0
SORE THROAT: 0
CHEST TIGHTNESS: 0
WHEEZING: 0
PHOTOPHOBIA: 0
EYE PAIN: 0
ABDOMINAL DISTENTION: 0
APNEA: 0
CHOKING: 0
BOWEL INCONTINENCE: 0
VISUAL CHANGE: 0

## 2021-04-07 NOTE — PROGRESS NOTES
[facility]  Neurology    [address] [place] [address]  CTSXP:[phone]   Fax: [phone]        SUBJECTIVE:       PATIENT ID:  Robin Mackey is a  RIGHT    HANDED 64 y.o. female. Dizziness  This is a recurrent problem. Episode onset: SINCE   FEB. 2021. The problem occurs intermittently. The problem has been waxing and waning. Associated symptoms include numbness and vertigo. Pertinent negatives include no abdominal pain, anorexia, arthralgias, change in bowel habit, chest pain, chills, congestion, coughing, diaphoresis, fatigue, fever, headaches, joint swelling, myalgias, nausea, neck pain, rash, sore throat, swollen glands, urinary symptoms, visual change, vomiting or weakness. Nothing aggravates the symptoms. She has tried rest and sleep for the symptoms. The treatment provided moderate relief. Neurologic Problem  The patient's primary symptoms include clumsiness, focal sensory loss and a loss of balance. The patient's pertinent negatives include no altered mental status, focal weakness, memory loss, near-syncope, syncope, visual change or weakness. Primary symptoms comment: H/O   VERTIGO    IN     2019  . This is a chronic problem. The neurological problem developed insidiously. The problem is unchanged. There was lower extremity, left-sided and right-sided focality noted. Associated symptoms include dizziness, light-headedness and vertigo. Pertinent negatives include no abdominal pain, auditory change, aura, back pain, bladder incontinence, bowel incontinence, chest pain, confusion, diaphoresis, fatigue, fever, headaches, nausea, neck pain, palpitations, shortness of breath or vomiting. Past treatments include bed rest and sleep. The treatment provided no relief. There is no history of a bleeding disorder, a clotting disorder, a CVA, dementia, head trauma, liver disease, mood changes or seizures.              History obtained from  The patient    AND  HER  DAUGHTER and other  available   medical  records   were  Also  reviewed. The  Duration,  Quality,  Severity,  Location,  Timing,  Context,  Modifying  Factors   Of   The   Chief   Complaint       And  Present  Illness  Was   Reviewed   In   Chronological   [facility]. PATIENT'S  MAIN  CONCERNS INCLUDE :                       1)       H/O    RECURRENT    VERTIGO    SINCE      MARCH 2019                               2)           HAD     CT   HEAD   IN    [facility]    2019       WAS    REPORTED  TO                                        BE  NEGATIVE                              3)     H/O      CHRONIC      ATRIAL   FIBRILLATIONS                                     -    ON   COUMADIN                               4)     H/O     DIZZINESS      AND     LIGHT  HEADEDNESS                                        LASTING      1/2        TO     ONE    DAY        INTERMITTENTLY                                    SINCE     FEB. 2021                                 5)       D /  D      INCLUDE                                            VASOVAGAL,     CARDIAC  ,                                           CEREBRO VASCULAR ,   METABOLIC                                                AUTONOMIC      NEUROPATHY       AND                                             OTHER    ETIOLOGIES                                6)   KNOWN     H/O     TYPE  II   DM                                 7)      CO  MORBID  MEDICAL  CONDITIONS                                   BEING  FOLLOWED  BY     HER  PCP                                                                    8)                        PATIENT    HAD    NEURO  DIAGNOSTIC  EVALUATIONS   IN   [facility]   2021                                  A)    MRI  BRAIN     SHOWED    NO  ACUTE  PATHOLOGY                                               CHRONIC  CEREBRAL  ISCHEMIA                                    B)     CAROTID   DOPPLER   SHOWED : RIGHT  ICA      50 - 69  %                                                  -   NEEDS  MONITORING                                          C)        TCD     -     VERTEBROBASILAR  STENOSIS                                      D)    EEG    SHOWED   NO  SIGNIFICANT  ABNORMALITIES                                        -    ABOVE  REVIEWED   WITH  PATIENT  AND  HER  FAMILY                               9)      PATIENT    FEELS     HER  DIZZINESS    IMPROVED   SIGNIFICANTLY                                             MORE  THAN     95  %                                     PATIENT  DENIES    ANY    NEW  NEUROLOGICAL   CONCERNS. 10)       VARIOUS  RISK   FACTORS   WERE  REVIEWED   AND   DISCUSSED. PATIENT   HAS  MULTIPLE   MEDICAL, NEUROLOGICAL     PROBLEMS . PATIENT'S   MANAGEMENT  IS  CHALLENGING.                                             PRECIPITATING  FACTORS: including  fever/infection, exertion/relaxation, position change, stress,                weather change,   medications/alcohol, time of day/darkness/light  Are  absent                                                          MODIFYING  FACTORS:  fever/infection, exertion/relaxation, position change, stress, weather change,               medications/alcohol, time of day/darkness/light  Are  absent                Patient   Indicates   The  Presence   And  The  Absence  Of  The  Following    Associated  And             Additional  Neurological    Symptoms:                                Balance  And coordination   problems  present           Gait problems     absent            Headaches      absent              Migraines           absent           Memory problemsabsent              Confusion        absent            Paresthesia   numbness          present           Seizures  And  Starring  Episodes           absent           Syncope,  Near syncopal episodes         absent           Speech   problems           absent             Swallowing   Problems      absent            Dizziness,  Light headedness           present              Vertigo        present             Generalized   Weakness    absent              focal  Weakness     absent             Tremors         absent              Sleep  Problems     absent             History  Of   Recent  Head  Injury     absent             History  Of   Recent  TIA     absent             History  Of   Recent    Stroke     absent             Neck  Pain   and   Neck muscle  Spasms  absent               Radiating  down   And   Weakness           absent            Lower back   Pain  And     Spasms  absent              Radiating    Down   And   Weakness          absent                H/OFALLS        absent               History  Of   Visual  Symptoms    absent                  Associated   Diplopia       absent                                               Also   Additional   Symptoms   Present    As  Documented    In   The   detailed                  Review  Of  Systems   And    Please   Refer   To    Them for   Additional    Information. Any components  That are either  Unobtainable  Or  Limited  In   HPI, ROS  And/or PFSH   Are                   Due   ToPatient's  Medical  Problems,  Clinical  Condition   and/or lack of                                 other    Alternate   resources. RECORDS   REVIEWED:    historical medical records           INFORMATION   REVIEWED:     MEDICAL   HISTORY,SURGICAL   HISTORY,     MEDICATIONS   LIST,   ALLERGIES AND  DRUG  INTOLERANCES,       FAMILY   HISTORY,  SOCIAL  HISTORY,      PROBLEM  LIST   FOR  PATIENT  CARE   COORDINATION          Past Medical History:   Diagnosis Date    Hypertension     Type II or unspecified type diabetes mellitus without mention of complication, not stated as uncontrolled          History reviewed.  No pertinent surgical history. Current Outpatient Medications   Medication Sig Dispense Refill    warfarin (COUMADIN) 2 MG tablet Take 2 mg by mouth As directed      glipiZIDE (GLUCOTROL XL) 5 MG extended release tablet       carvedilol (COREG) 6.25 MG tablet Take by mouth 2 times daily       digoxin (LANOXIN) 250 MCG tablet Take 250 mcg by mouth daily \"5 times a week, skipping 2 days\"      famotidine (PEPCID) 20 MG tablet Take 20 mg by mouth 2 times daily       furosemide (LASIX) 20 MG tablet Take 20 mg by mouth daily       metFORMIN (GLUCOPHAGE) 1000 MG tablet Take 1,000 mg by mouth 2 times daily (with meals)       spironolactone (ALDACTONE) 25 MG tablet Take 25 mg by mouth daily       warfarin (COUMADIN) 2.5 MG tablet Take 2.5 mg by mouth daily \"6 days a week\"       No current facility-administered medications for this visit.           No Known Allergies      Family History   Problem Relation Age of Onset    Other Mother         detached retina ou    Glaucoma Neg Hx     Cataracts Neg Hx     Diabetes Neg Hx          Social History     Socioeconomic History    Marital status:      Spouse name: Not on file    Number of children: Not on file    Years of education: Not on file    Highest education level: Not on file   Occupational History    Not on file   Social Needs    Financial resource strain: Not on file    Food insecurity     Worry: Not on file     Inability: Not on file    Transportation needs     Medical: Not on file     Non-medical: Not on file   Tobacco Use    Smoking status: Never Smoker    Smokeless tobacco: Never Used   Substance and Sexual Activity    Alcohol use: Not on file    Drug use: Not on file    Sexual activity: Not on file   Lifestyle    Physical activity     Days per week: Not on file     Minutes per session: Not on file    Stress: Not on file   Relationships    Social connections     Talks on phone: Not on file     Gets together: Not on file     Attends Orthodoxy service: Not on file     Active member of club or organization: Not on file     Attends meetings of clubs or organizations: Not on file     Relationship status: Not on file    Intimate partner violence     Fear of current or ex partner: Not on file     Emotionally abused: Not on file     Physically abused: Not on file     Forced sexual activity: Not on file   Other Topics Concern    Not on file   Social History Narrative    Not on file       Vitals:    04/07/21 1319   BP: 128/82   Pulse: 101   SpO2: 95%         Wt Readings from Last 3 Encounters:   04/07/21 226 lb (102.5 kg)   03/30/21 227 lb (103 kg)   03/03/21 227 lb (103 kg)         BP Readings from Last 3 Encounters:   04/07/21 128/82   03/30/21 118/66   03/03/21 118/80             Review of Systems   Constitutional: Negative for appetite change, chills, diaphoresis, fatigue, fever and unexpected weight change. HENT: Negative for congestion, dental problem, drooling, ear discharge, ear pain, facial swelling, hearing loss, mouth sores, nosebleeds, postnasal drip, sinus pressure, sore throat, tinnitus, trouble swallowing and voice change. Eyes: Negative for photophobia, pain, discharge, redness, itching and visual disturbance. Respiratory: Negative for apnea, cough, choking, chest tightness, shortness of breath and wheezing. Cardiovascular: Negative for chest pain, palpitations, leg swelling and near-syncope. Gastrointestinal: Negative for abdominal distention, abdominal pain, anorexia, blood in stool, bowel incontinence, change in bowel habit, constipation, diarrhea, nausea and vomiting. Endocrine: Negative for cold intolerance, heat intolerance, polydipsia, polyphagia and polyuria. Genitourinary: Negative for bladder incontinence. Musculoskeletal: Negative for arthralgias, back pain, gait problem, joint swelling, myalgias, neck pain and neck stiffness. Skin: Negative for color change, pallor, rash and wound.    Allergic/Immunologic: Negative for environmental allergies, food allergies and immunocompromised state. Neurological: Positive for dizziness, vertigo, light-headedness, numbness and loss of balance. Negative for tremors, focal weakness, seizures, syncope, facial asymmetry, speech difficulty, weakness and headaches. Hematological: Negative for adenopathy. Does not bruise/bleed easily. Psychiatric/Behavioral: Negative for agitation, behavioral problems, confusion, decreased concentration, dysphoric mood, hallucinations, memory loss, self-injury, sleep disturbance and suicidal ideas. The patient is not nervous/anxious and is not hyperactive. OBJECTIVE:    Physical Exam  Constitutional:       Appearance: She is well-developed. HENT:      Head: Normocephalic and atraumatic. No raccoon eyes or Richardson's sign. Right Ear: External ear normal.      Left Ear: External ear normal.      Nose: Nose normal.   Eyes:      Conjunctiva/sclera: Conjunctivae normal.      Pupils: Pupils are equal, round, and reactive to light. Neck:      Musculoskeletal: Normal range of motion and neck supple. Normal range of motion. No neck rigidity or muscular tenderness. Thyroid: No thyroid mass or thyromegaly. Vascular: No carotid bruit. Trachea: No tracheal deviation. Meningeal: Brudzinski's sign and Kernig's sign absent. Cardiovascular:      Rate and Rhythm: Normal rate and regular rhythm. Pulmonary:      Effort: Pulmonary effort is normal.   Musculoskeletal:         General: No tenderness. Skin:     General: Skin is warm. Coloration: Skin is not pale. Findings: No erythema or rash. Nails: There is no clubbing. Psychiatric:         Attention and Perception: She is attentive. Mood and Affect: Mood is not anxious or depressed. Affect is not labile, blunt or inappropriate. Speech: She is communicative.  Speech is not rapid and pressured, delayed, slurred or tangential.         Behavior: Behavior is not agitated, slowed, aggressive, withdrawn, hyperactive or combative. Behavior is cooperative. Thought Content: Thought content is not paranoid or delusional. Thought content does not include homicidal or suicidal ideation. Thought content does not include homicidal or suicidal plan. Cognition and Memory: Memory is not impaired. She does not exhibit impaired recent memory or impaired remote memory. Judgment: Judgment is not impulsive or inappropriate. NEUROLOGICALEXAMINATION :      A) MENTAL STATUS:                   Alert and  oriented  To time, place  And  Person. No Aphasia. No  Dysarthria. Able   To  Follow     SIMPLE    commands   without   Any  Difficulty. No right  To left confusion. Normal  Speech  And language function. Insight and  Judgment ,Fund  Of  Knowledge   within normal limits                Recent  And  Remote memory  within   normal limits                Attention &  Concentration are within   normal limits                                                   B) CRANIAL NERVES :        CN : Visual  Acuity  And  Visual fields  within normal limits               Fundi  Could  Not  Be  Could  Not  Be  Evaluated. 3,4,6 CN : Both  Pupils are   Reactive and  Equal.  Movements  Are  Intact. No  Nystagmus. No  BERTO. No  Afferent  Pupillary  Defect noted. 5 CN :  Normal  Facial sensations and Corneal  Reflexes           7 CN:  Normal  Facial  Symmetry  And  Strength. No facial  Weakness.            8 CN :  Hearing  Appears within normal limits          9, 10 CN: Normal   spontaneous, reflex   palate   movements         11 CN:   Normal  Shoulder  shrug and  strength         12 CN :   Normal  Tongue movements and  Tongue  In midline                        No tongue   Fasciculations or atrophy       C) MOTOR  EXAM:                 Strength  In upper  And  Lower   extremities   within   normal limits               No  Drift. No  Atrophy               Rapid   alternating  And  repetitions  Movements  within   normal limits                 Muscle  Tone  In upper  And  Lower  Extremities  normal                No rigidity. No  Spasticity. Bradykinesia   absent                 No  Asterixis. Sustention  Tremor , Resting   Tremor   absent                    No   other  Abnormal  Movements noted           D) SENSORY :               Light   touch, pinprick,   position  And  Vibration  DECREASED         E) REFLEXES:                   Deep  Tendon  Reflexes      DECREASED                    No  pathological  Reflexes  Bilaterally.                                   F) COORDINATION  AND  GAIT :                                Station and  Gait     SLOW                             Romberg 's test    POSITIVE                            Ataxia negative          ASSESSMENT:        Patient Active Problem List   Diagnosis    Background diabetic retinopathy ([address]    Cataract    Myopia of both eyes with astigmatism and presbyopia    Dizziness    Vertigo    Balance problem    Peripheral polyneuropathy    Diabetes mellitus type II, controlled ([address]    Atrial fibrillation ([address]    Current use of long term anticoagulation    Cerebral ischemia    Hyperlipidemia    GERD (gastroesophageal reflux disease)    Essential hypertension    Arthritis of knee     MRI OF THE BRAIN WITHOUT CONTRAST  3/10/2021 8:27 am       TECHNIQUE:   Multiplanar multisequence MRI of the brain was performed without the   administration of intravenous contrast.       COMPARISON:   None.       HISTORY:   ORDERING SYSTEM PROVIDED HISTORY: Background diabetic retinopathy ([address]   TECHNOLOGIST PROVIDED HISTORY:   Acuity: Acute   Type of Exam: Initial   Additional signs and symptoms: Background diabetic retinopathy; Myopia of   both eyes with astigmatism and presbyopia     FINDINGS:   INTRACRANIAL STRUCTURES/VENTRICLES: The sellar and suprasellar structures,   optic chiasm, corpus callosum, pineal gland, tectum, and midline brainstem   structures are unremarkable.  The craniocervical junction is unremarkable.    There is no acute hemorrhage, mass effect, or midline shift. Marily Pilon is   satisfactory overall gray-white matter differentiation.  There is mild   chronic microvascular disease.  The ventricular structures are symmetric and   unremarkable.  The infratentorial structures including the cerebellopontine   angles and internal auditory canals are unremarkable.  There is no abnormal   restricted diffusion.  There is no abnormal blooming artifact on   susceptibility weighted imaging.       ORBITS: The visualized portion of the orbits demonstrate no acute abnormality.       SINUSES: There is a polyp versus mucous retention cyst in the right maxillary   sinus.       BONES/SOFT TISSUES: The bone marrow signal intensity appears normal. The soft   tissues demonstrate no acute abnormality.           Impression   Mild chronic microvascular disease without acute intracranial abnormality.           ULTRASOUND EVALUATION OF THE CAROTID ARTERIES       3/10/2021       COMPARISON:   None.       HISTORY:   ORDERING SYSTEM PROVIDED HISTORY: Background diabetic retinopathy ([facility]       FINDINGS:       RIGHT:       The right common carotid artery demonstrates peak systolic velocities of 81,   74 cm/sec in the proximal and distal segments respectively.       The right internal carotid artery demonstrates the systolic velocities of 85,   129, 79 cm/sec in the proximal, mid and distal segments respectively.  Vessel   tortuosity limits evaluation.       The external carotid artery is patent.  The vertebral artery demonstrates   normal antegrade flow.       Moderate plaque.       ICA/CCA ratio of 0.9.           LEFT:       The left common carotid artery demonstrates peak systolic velocities of 93,   91 cm/sec in the proximal and distal segments respectively.       The left internal carotid artery demonstrates the systolic velocities of 61,   75, 60 cm/sec in the proximal, mid and distal segments respectively.       The external carotid artery is patent.  The vertebral artery demonstrates   normal antegrade flow.       Mild plaque.       ICA/CCA ratio of 0.7.           Impression   The right internal carotid artery demonstrates 50-69% stenosis by velocity   criteria, although velocities may be falsely elevated by vessel tortuosity.       The left internal carotid artery demonstrates 0-50% stenosis .       Bilateral vertebral arteries are patent with flow in the normal direction. VISITING DIAGNOSIS:        ICD-10-CM    1. Dizziness  R42    2. Peripheral polyneuropathy  G62.9    3. Controlled type 2 diabetes mellitus with diabetic nephropathy, without long-term current use of insulin ([facility])  E11.21    4. Vertigo  R42    5. Atrial fibrillation, unspecified type ([facility]  I48.91    6. Gastroesophageal reflux disease, unspecified whether esophagitis present  K21.9    7. Cerebral ischemia  I67.82    8. Balance problem  R26.89    9. Controlled type 2 diabetes mellitus with other neurologic complication, without long-term current use of insulin ([facility])  E11.49    10. Background diabetic retinopathy ([facility]  S92.4152    11. Current use of long term anticoagulation  Z79.01               CONCERNS   &   INCREASED   RISK   FOR         * TIA,  CEREBRO  VASCULAR  ISCHEMIA, STROKE     *   DIZZINESS,   VERTEBROBASILAR  INSUFFICIENCY ,       *   ORTHOSTATIC  INTOLERANCE,    AUTONOMIC  NEUROPATHY        *   PERIPHERAL  NEUROPATHY      *     BALANCE PROBLMES   AND  FALL                VARIOUS  RISK   FACTORS   WERE  REVIEWED   AND   DISCUSSED. *  PATIENT   HAS  MULTIPLE   MEDICAL, NEUROLOGICAL    PROBLEMS            PATIENT'S   MANAGEMENT  IS  CHALLENGING.             PLAN:                         Sheliah Cowden  Of  The  Diagnoses, The  Management & Treatment  Options            AND    Care  plan  Were          Reviewed and   Discussed   With  patient. * Goals  And  Expectations  Of  The  Therapy  Discussed   And  Reviewed. *   Benefits   And   Side  Effect  Profile  Of  Medication/s   Were   Discussed                * Need   For  Further   Follow up For  The  Various  Problems Were  discussed. * Results  Of  The  Previous  Diagnostic tests were reviewed and  discussed                   Medical  Decision  Making  Was  Made  Based on the   Complexity  Of  Above  Mentioned  Diagnoses,    Data reviewed             And    Risk  Of  Significant   Co morbidities and   complicating   Factors. Medical  Decision  Was   High    Complexity   Due   To  The  Patient's  Multiple  Symptoms,  Advancing   Disease,            Complex  Treatment  Regimen,  Multiple medications           and   Risk  Of   Side  Effects,  Difficulty  In  Medication  Management  And  Diagnostic  Challenges           In  View  Of  The  Associated   Co  Morbid  Conditions   And  Problems. * FALL   PRECAUTIONS. THESE  REVIEWED   AND  DISCUSSED          *   BE  CAREFUL  WITH  ACTIVITIES   INCLUDING  DRIVING. *   ADEQUATE   FLUID  INTAKE   AND  ELECTROLYTE  BALANCE           * KEEP  DAIRY  OF   THE  NEUROLOGICAL  SYMPTOMS        RECORDING THE    DURATION  AND  FREQUENCY. *  AVOID    CONDITIONS  AND  FACTORS   THAT  MAKE                  NEUROLOGICAL  SYMPTOMS  WORSE.                   *TO  MAINTAIN  REGULAR  SLEEP  WAKE  CYCLES.      *   TO  HAVE  ADEQUATE  REST  AND   SLEEP    HOURS.              *    AVOID  ANY USAGE OF    TOBACCO,          AVOID  EXCESSIVE  ALCOHOL  AND   ILLEGAL   SUBSTANCES              *  CONTINUE   MEDICATIONS    PRESCRIBED       AS    RECOMMENDED       *   Compliance   With  Medications   And  Instructions          *    Prophylactic  Use   Of     Vitamin   B   Complex, Folic  Acid,    Vitamin  B12    Multivitamin,       Calcium  With  magnesium  And  Vit D    Supplementations   Over  The  Counter  Discussed             *FOOT  CARE, DAILY  INSPECTION  OF  FEET   AND         PERIODIC  PODIATRY EVALUATIONS . *  EVALUATIONS  AND  FOLLOW UP:                   * PHYSICAL  THERAPY                                  *CARDIOLOGY              *   OPTHALMOLOGY                                          *        PATIENT    HAD    NEURO  DIAGNOSTIC  EVALUATIONS   IN   [facility]   2021                                  A)    MRI  BRAIN     SHOWED    NO  ACUTE  PATHOLOGY                                               CHRONIC  CEREBRAL  ISCHEMIA                                    B)     CAROTID   DOPPLER   SHOWED :                                                   RIGHT  ICA      50 - 69  %                                                  -   NEEDS  MONITORING                                          C)        TCD     -     VERTEBROBASILAR  STENOSIS                                      D)    EEG    SHOWED   NO  SIGNIFICANT  ABNORMALITIES                                        -    ABOVE  REVIEWED   WITH  PATIENT  AND  HER  FAMILY                            *            PATIENT    FEELS     HER  DIZZINESS    IMPROVED   SIGNIFICANTLY                                             MORE  THAN     95  %                                     PATIENT  DENIES    ANY    NEW  NEUROLOGICAL   CONCERNS. *PATIENT   TO  FOLLOW  UP  WITH   PRIMARY  CARE         OTHER  CONSULTANTS  AS  BEFORE. *  Maintain   Healthy  Life Style    With   Periodic  Monitoring  Of          Any  Medical  Conditions  Including   Elevated  Blood  Pressure,  Lipid  Profile,        Blood  Sugar levels  AndHeart  Disease.                 *   Period   Screening  For  Cancers  Involving  Breast,  Colon,         Lungs  And  Other  Organs  As  Applicable,  In consultation   With  Your  Primary Care Providers. *Second  Neurological  Opinion  And  Evaluations  In  [facility]  Setting  If  Patient  Is  Interested. * Please   Contact   Neurology  Clinic   Early   If   Are  Any  New  Neurological   And  Any neurological  Concerns. *  If  The  Patient remains  Neurologically  Stable   Return   To  [facility] Neurology Department   IN      3 - 6         MONTHS  TIME   FOR  FURTHER              FOLLOW UP.                       *   The  Neurological   Findings,  Possible  Diagnosis,  Differential diagnoses                    And  Options  For    Further   Investigations                   And  management   Are  Discussed  Comprehensively. Medications   And  Prescription   Risks  And  Side effects  Are   Also  Discussed. *  If   There is  Any  Significant  Worsening   Of  Current  Symptoms  And  Or                  If patient  Develops   Any additional  New  NeurologicalSymptoms                  Or  Significant  Concerns   Should  Call  911 or  Go  To  Emergency  Department  For  Further  Immediate  Evaluation. The   Above  Were  Reviewed  With  patient   and                       questions  Answered  In  Detail. More   Than  50% of face  To face Time   Was  Spent  On  Counseling                    And   Coordination  Of  Care   Of   Patient's  multiple   Neurological  Problems                         And   Comorbid  Medical   Conditions. Electronically signed by   Gianfranco Barreto M.D., John Duggan. Board Certified in  Neurology &  In  [facility] ([employer])      DISCLAIMER:   Although every effort was made to ensure the accuracy of this  electronictranscription, some errors in transcription may have occurred.       GENERAL PATIENT INSTRUCTIONS:      A Healthy Lifestyle: Care Instructions   Your Care Instructions   A healthy lifestyle can help you feel good, stay at ahealthy weight, and have plenty of energy for both work and play. A healthy lifestyle is something you can share with your whole family.  A healthy lifestyle also can lower your risk for serious health problems, such ashigh blood pressure, heart disease, and diabetes.  You can follow a few steps listed below to improve your health and the health of your family.  Follow-up careis a key part of your treatment and safety. Be sure to make and go to all appointments, and call your doctor if you are having problems. Its also a good idea to know your test results and keep a list of the medicines you take.  How can you care for yourself at home?  Do not eat too much sugar, fat, or fast foods. You can still have dessert and treats nowand then. The goal is moderation.  Start small to improve your eating habits. Pay attention to portion sizes, drink less juice and soda pop, and eat more fruits and vegetables.  Eat a healthy amount of food. A 3-ounce serving of meat, for example, is about the size of a deck of cards. Fill the rest of your plate with vegetables and whole grains.  Limit theamount of soda and sports drinks you have every day. Drink more water when you are thirsty.  Eat at least 5 servings of fruits and vegetables every day. It may seem like a lot, but it is not hard to reach this goal. Aserving or helping is 1 piece of fruit, 1 cup of vegetables, or 2 cups of leafy, raw vegetables. Have an apple or some carrot sticks as an afternoon snack instead of a candy bar. Try to have fruits and/or vegetables at everymeal.   Make exercise part of your daily routine. You may want to start with simple activities, such as walking, bicycling, or slow swimming. Try eddie active 30 to 60 minutes every day. You do not need to do all 30 to 60 minutes all at once.  For example, you can exercise 3 times a day for 10 or 20 minutes. Moderate exercise is safe for most people, but it is always agood idea to talk to your doctor before starting an exercise program.   Keep moving. Darrick Jeffrey the lawn, work in the garden, or seniorshelf.com. Take the stairs instead of the elevator at work.  If you smoke, quit. Peoplewho smoke have an increased risk for heart attack, stroke, cancer, and other lung illnesses. Quitting is hard, but there are ways to boost your chance of quitting tobacco for good.  Use nicotine gum, patches, or lozenges.  Ask your doctor about stop-smoking programs and medicines.  Keep trying.  In addition to reducing your risk of diseases in the future, you will notice some benefits soon after you stop using tobacco. If you have shortness of breath or asthma symptoms, they will likely getbetter within a few weeks after you quit.  Limit how much alcohol you drink. Moderate amounts of alcohol (up to 2 drinks a day for men, 1drink a day for women) are okay. But drinking too much can lead to liver problems, high blood pressure, and other health problems.  health   If you have a family, there are many things you can do together to improve your health.  Eat meals together as a family as often as possible.  Eat healthy foods. This includes fruits, vegetables, lean meats and dairy, and whole grains.  Include your family in your fitness plan. Most peoplethink of activities such as jogging or tennis as the way to fitness, but there are many ways you and your family can be more active. Anything that makes you breathe hard and gets your heart pumping is exercise. Here are sometips:   Walk to do errands or to take your child to school or the bus.  Go for a family bike ride after dinner instead of watching TV.  Where can you learn more?  Go [email or web address] and sign in to your [employer] account.  Enter Y948 in the Search HealthInformation box to learn more about \"A Healthy Lifestyle: Care Instructions. \"     If you do not have anaccount, please click on the \"Sign Up Now\" link.  Current as of: July 26, 2016   Content Version: 11.2   © 4194-8610 [employer]. Care instructions adapted under license by [facility] ([facility]). If you have questions about a medical condition or this instruction, always ask your healthcare professional. [employer] disclaims any warranty or liability for your use of this information.

## 2021-04-08 ENCOUNTER — TELEPHONE (OUTPATIENT)
Dept: CARDIOLOGY | Age: 76
End: 2021-04-08

## 2021-04-08 ENCOUNTER — HOSPITAL ENCOUNTER (OUTPATIENT)
Dept: NON INVASIVE DIAGNOSTICS | Age: 76
Discharge: HOME | End: 2021-04-08
Payer: MEDICARE

## 2021-04-08 ENCOUNTER — HOSPITAL ENCOUNTER (OUTPATIENT)
Dept: NUCLEAR MEDICINE | Age: 76
Discharge: HOME | End: 2021-04-10
Payer: MEDICARE

## 2021-04-08 ENCOUNTER — APPOINTMENT (OUTPATIENT)
Dept: NUCLEAR MEDICINE | Age: 76
End: 2021-04-08
Payer: MEDICARE

## 2021-04-08 DIAGNOSIS — R94.39 ABNORMAL STRESS TEST: Primary | ICD-10-CM

## 2021-04-08 DIAGNOSIS — R94.31 ABNORMAL ECG: ICD-10-CM

## 2021-04-08 DIAGNOSIS — R06.09 DYSPNEA ON EXERTION: ICD-10-CM

## 2021-04-08 DIAGNOSIS — I10 ESSENTIAL HYPERTENSION: ICD-10-CM

## 2021-04-08 LAB
LV EF: 65 %
LVEF MODALITY: NORMAL

## 2021-04-08 PROCEDURE — 93018 CV STRESS TEST I&R ONLY: CPT | Performed by: INTERNAL MEDICINE

## 2021-04-08 PROCEDURE — 78452 HT MUSCLE IMAGE SPECT MULT: CPT

## 2021-04-08 PROCEDURE — 93306 TTE W/DOPPLER COMPLETE: CPT

## 2021-04-08 PROCEDURE — 93017 CV STRESS TEST TRACING ONLY: CPT

## 2021-04-08 PROCEDURE — 3430000000 HC RX DIAGNOSTIC RADIOPHARMACEUTICAL: Performed by: INTERNAL MEDICINE

## 2021-04-08 PROCEDURE — A9500 TC99M SESTAMIBI: HCPCS | Performed by: INTERNAL MEDICINE

## 2021-04-08 PROCEDURE — 6360000002 HC RX W HCPCS: Performed by: INTERNAL MEDICINE

## 2021-04-08 RX ADMIN — TETRAKIS(2-METHOXYISOBUTYLISOCYANIDE)COPPER(I) TETRAFLUOROBORATE 10 MILLICURIE: 1 INJECTION, POWDER, LYOPHILIZED, FOR SOLUTION INTRAVENOUS at 10:19

## 2021-04-08 RX ADMIN — TETRAKIS(2-METHOXYISOBUTYLISOCYANIDE)COPPER(I) TETRAFLUOROBORATE 30 MILLICURIE: 1 INJECTION, POWDER, LYOPHILIZED, FOR SOLUTION INTRAVENOUS at 10:19

## 2021-04-08 RX ADMIN — REGADENOSON 0.4 MG: 0.08 INJECTION, SOLUTION INTRAVENOUS at 09:31

## 2021-04-08 NOTE — TELEPHONE ENCOUNTER
Spoke with pt. Notified her stress was abnormal and Dr Deidre Mendiola will consider heart cath and let us know. She should use ER in meantime for cardiac symptoms and we will let her know Dr Zach Garzon recommendations. She accepts. Also had echo, please review.

## 2021-04-08 NOTE — PROCEDURES
[facility] [address]                 [address] [place] [id number]                              CARDIAC STRESS TEST    PATIENT NAME: Joe Hernandez                       :        1945  MED REC NO:   [id number]                             ROOM:  ACCOUNT NO:   [de-identified]                           ADMIT DATE: 2021  PROVIDER:     Ulysses Loco DO    DATE OF STUDY:  2021    STRESS TEST    Ordering Provider: Yue Flowers MD    Primary Care Provider: Sanam Hoang DO    Patient's Age: 64     Height: 5 feet, 7 inches  Weight: 227 pounds    INDICATION:  Abnormal EKG, shortness of breath. Lexiscan 0.4 mg injected over 10 seconds. IV Cardiolite injected 20 seconds post Lexiscan injection. Heart Rate: 85 Resting blood pressure:  142/76              HR   BP  1 min          96   131/74  2 min  3 min          85   147/77  4 min  5 min          84   144/70  6 min  7 min          83   137/68  8 min  9 min          88   147/94  10 min    Symptoms:  Chest Pain: No.  Nausea: No.  Headache:  No.  Shortness of breath: Yes. Other: No.    Resting EKG:  Atrial fibrillation, prolong QT. Arrhythmias: None. EKG changes:  None. INTERPRETATION:  1. Negative ECG portion of Lexiscan Stress Test.  2. Await Nuclear portion. Nuclear Myocardial Perfusion Imaging (SPECT)    TESTING METHOD  STRESS:   Lexiscan  AGENT:    Cardiolite  REST:          Injection Date:  21  Time:  815  Amount:  13.42 mCi  STRESS:   Injection Date:  21  Time:  924  Amount:  38.6 mCi  IMAGE TIME:    Rest:  853  Stress:  1012    EF:  77%  TID:  0.82  LHR:  0.55    FINDINGS:  Ischemia (Reversible Defect):           Yes. Infarction (Irreversible Defect):       No.  Ejection fraction Calculated:           Normal.  Segmental wall motion:                  Normal.    IMPRESSION:  1. Moderate sized, moderate intensity, mid and apical anterolateral  ischemia. 2. No infarction.   3. Normal left ventricular ejection fraction 77%, and normal wall  motion.           CIRA HERNANDEZ DO    D: 04/08/2021 14:35:14       T: 04/08/2021 14:37:09     /[email or web address]_ROMERO  Job#: [id number]     Doc#: Unknown    CC:  Irasema Burgos D.O.

## 2021-04-08 NOTE — PROCEDURES
[facility] [address]                 [address] [place] [id number]                              CARDIAC STRESS TEST    PATIENT NAME: Yamil Almazan                       :        1945  MED REC NO:   [id number]                             ROOM:  ACCOUNT NO:   [de-identified]                           ADMIT DATE: 2021  PROVIDER:     Felice Flores MD    DATE OF STUDY:  2021    STRESS TEST    Ordering Provider: Felice Flores MD    Primary Care Provider: Elizabeth Hooks DO    Patient's Age: 64     Height: 5 feet, 7 inches  Weight: 227 pounds    INDICATION:  Abnormal EKG, shortness of breath. Lexiscan 0.4 mg injected over 10 seconds. IV Cardiolite injected 20 seconds post Lexiscan injection. Heart Rate: 85 Resting blood pressure:  142/76              HR   BP  1 min          96   131/74  2 min  3 min          85   147/77  4 min  5 min          84   144/70  6 min  7 min          83   137/68  8 min  9 min          88   147/94  10 min    Symptoms:  Chest Pain: No.  Nausea: No.  Headache:  No.  Shortness of breath: Yes. Other: No.    Resting EKG:  Atrial fibrillation, prolong QT. Arrhythmias: None. EKG changes:  None. INTERPRETATION:  1. Negative ECG portion of Lexiscan Stress Test.  2. Await Nuclear portion. Nuclear Myocardial Perfusion Imaging (SPECT)    TESTING METHOD  STRESS:   Lexiscan  AGENT:    Cardiolite  REST:          Injection Date:  21  Time:  815  Amount:  13.42 mCi  STRESS:   Injection Date:  21  Time:  924  Amount:  38.6 mCi  IMAGE TIME:    Rest:  853  Stress:  1012    EF:  77%  TID:  0.82  LHR:  0.55    FINDINGS:  Ischemia (Reversible Defect):           Yes. Infarction (Irreversible Defect):       No.  Ejection fraction Calculated:           Normal.  Segmental wall motion:                  Normal.    IMPRESSION:  1. Moderate sized, moderate intensity, mid and apical anterolateral  ischemia. 2. No infarction.   3.

## 2021-04-08 NOTE — PROCEDURES
[facility] [address]                 [address] [place] [id number]                              CARDIAC STRESS TEST    PATIENT NAME: Nighat Arteaga                       :        1945  MED REC NO:   [id number]                             ROOM:  ACCOUNT NO:   [de-identified]                           ADMIT DATE: 2021  PROVIDER:     Miguel Ángel Beckwith DO    DATE OF STUDY:  2021    STRESS TEST    Ordering Provider: Alden Torres MD    Primary Care Provider: Marion House DO    Patient's Age: 64     Height: 5 feet, 7 inches  Weight: 227 pounds    INDICATION:  Abnormal EKG, shortness of breath. Lexiscan 0.4 mg injected over 10 seconds. IV Cardiolite injected 20 seconds post Lexiscan injection. Heart Rate: 85 Resting blood pressure:  142/76              HR   BP  1 min          96   131/74  2 min  3 min          85   147/77  4 min  5 min          84   144/70  6 min  7 min          83   137/68  8 min  9 min          88   147/94  10 min    Symptoms:  Chest Pain: No.  Nausea: No.  Headache:  No.  Shortness of breath: Yes. Other: No.    Resting EKG:  Atrial fibrillation, prolong QT. Arrhythmias: None. EKG changes:  None. INTERPRETATION:  1. Negative ECG portion of Lexiscan Stress Test.  2. Await Nuclear portion. Nuclear Myocardial Perfusion Imaging (SPECT)    TESTING METHOD  STRESS:   Lexiscan  AGENT:    Cardiolite  REST:          Injection Date:  21  Time:  815  Amount:  13.42 mCi  STRESS:   Injection Date:  21  Time:  924  Amount:  38.6 mCi  IMAGE TIME:    Rest:  853  Stress:  1012    EF:  77%  TID:  0.82  LHR:  0.55    FINDINGS:  Ischemia (Reversible Defect):           Yes. Infarction (Irreversible Defect):       No.  Ejection fraction Calculated:           Normal.  Segmental wall motion:                  Normal.    IMPRESSION:  1. Moderate sized, moderate intensity, mid and apical anterolateral  ischemia. 2. No infarction.   3. Normal left ventricular ejection fraction 77%, and normal wall  motion.           [place], [place]    D: 04/08/2021 14:35:14       T: 04/08/2021 14:37:09     /[email or web address]_ROMERO  Job#: [id number]     Doc#: Unknown    CC:  Marion House D.O.

## 2021-04-09 LAB
BASOPHILS ABSOLUTE: NORMAL
BASOPHILS RELATIVE PERCENT: NORMAL
BUN BLDV-MCNC: 17 MG/DL
CALCIUM SERPL-MCNC: 9.1 MG/DL
CHLORIDE BLD-SCNC: 104 MMOL/L
CO2: 28.9 MMOL/L
CREAT SERPL-MCNC: 1.03 MG/DL
EOSINOPHILS ABSOLUTE: NORMAL
EOSINOPHILS RELATIVE PERCENT: NORMAL
GFR CALCULATED: 55
GLUCOSE BLD-MCNC: 99 MG/DL
HCT VFR BLD CALC: 42.8 % (ref 36–46)
HEMOGLOBIN: 14.4 G/DL (ref 12–16)
LYMPHOCYTES ABSOLUTE: NORMAL
LYMPHOCYTES RELATIVE PERCENT: NORMAL
MCH RBC QN AUTO: 31.6 PG
MCHC RBC AUTO-ENTMCNC: 33.6 G/DL
MCV RBC AUTO: 93.9 FL
MONOCYTES ABSOLUTE: NORMAL
MONOCYTES RELATIVE PERCENT: NORMAL
NEUTROPHILS ABSOLUTE: NORMAL
NEUTROPHILS RELATIVE PERCENT: NORMAL
PLATELET # BLD: 227 K/ΜL
PMV BLD AUTO: 10.6 FL
POTASSIUM SERPL-SCNC: 3.9 MMOL/L
RBC # BLD: 4.56 10^6/ΜL
SODIUM BLD-SCNC: 141 MMOL/L
WBC # BLD: 8.7 10^3/ML

## 2021-04-09 NOTE — TELEPHONE ENCOUNTER
Pt called today anxious about abn stress and possible cath. She doesn't want to wait and would like it to be addressed today so she can plan and let her family know and arrange transportation.

## 2021-04-09 NOTE — TELEPHONE ENCOUNTER
Spoke with pt, relayed info. Instructed her to use ER for any cardiac symptoms and she agrees and states understanding. Lab orders faxed to [facility] at pt's request and cath orders faxed to [facility].

## 2021-04-15 DIAGNOSIS — R94.39 ABNORMAL STRESS TEST: ICD-10-CM

## 2021-04-20 ENCOUNTER — HOSPITAL ENCOUNTER (OUTPATIENT)
Dept: CARDIAC CATH/INVASIVE PROCEDURES | Age: 76
Discharge: HOME | End: 2021-04-21
Attending: INTERNAL MEDICINE | Admitting: INTERNAL MEDICINE
Payer: MEDICARE

## 2021-04-20 DIAGNOSIS — Z95.820 S/P ANGIOPLASTY WITH STENT: ICD-10-CM

## 2021-04-20 LAB
POC INR: 1.3
PROTHROMBIN TIME, POC: 15.5 SEC (ref 10.4–14.2)

## 2021-04-20 PROCEDURE — 6360000002 HC RX W HCPCS

## 2021-04-20 PROCEDURE — C1874 STENT, COATED/COV W/DEL SYS: HCPCS

## 2021-04-20 PROCEDURE — 2580000003 HC RX 258: Performed by: STUDENT IN AN ORGANIZED HEALTH CARE EDUCATION/TRAINING PROGRAM

## 2021-04-20 PROCEDURE — 7100000000 HC PACU RECOVERY - FIRST 15 MIN

## 2021-04-20 PROCEDURE — 85610 PROTHROMBIN TIME: CPT

## 2021-04-20 PROCEDURE — C1769 GUIDE WIRE: HCPCS

## 2021-04-20 PROCEDURE — 6370000000 HC RX 637 (ALT 250 FOR IP)

## 2021-04-20 PROCEDURE — 2580000003 HC RX 258

## 2021-04-20 PROCEDURE — C9600 PERC DRUG-EL COR STENT SING: HCPCS | Performed by: INTERNAL MEDICINE

## 2021-04-20 PROCEDURE — C1725 CATH, TRANSLUMIN NON-LASER: HCPCS

## 2021-04-20 PROCEDURE — 6360000004 HC RX CONTRAST MEDICATION

## 2021-04-20 PROCEDURE — 93454 CORONARY ARTERY ANGIO S&I: CPT | Performed by: INTERNAL MEDICINE

## 2021-04-20 PROCEDURE — C1894 INTRO/SHEATH, NON-LASER: HCPCS

## 2021-04-20 PROCEDURE — C9601 PERC DRUG-EL COR STENT BRAN: HCPCS | Performed by: INTERNAL MEDICINE

## 2021-04-20 PROCEDURE — 6370000000 HC RX 637 (ALT 250 FOR IP): Performed by: STUDENT IN AN ORGANIZED HEALTH CARE EDUCATION/TRAINING PROGRAM

## 2021-04-20 PROCEDURE — 7100000001 HC PACU RECOVERY - ADDTL 15 MIN

## 2021-04-20 PROCEDURE — 2709999900 HC NON-CHARGEABLE SUPPLY

## 2021-04-20 PROCEDURE — 2500000003 HC RX 250 WO HCPCS

## 2021-04-20 PROCEDURE — C1887 CATHETER, GUIDING: HCPCS

## 2021-04-20 RX ORDER — FUROSEMIDE 20 MG/1
20 TABLET ORAL DAILY
Status: DISCONTINUED | OUTPATIENT
Start: 2021-04-20 | End: 2021-04-21 | Stop reason: HOSPADM

## 2021-04-20 RX ORDER — ACETAMINOPHEN 325 MG/1
650 TABLET ORAL EVERY 4 HOURS PRN
Status: DISCONTINUED | OUTPATIENT
Start: 2021-04-20 | End: 2021-04-21 | Stop reason: HOSPADM

## 2021-04-20 RX ORDER — ATORVASTATIN CALCIUM 40 MG/1
40 TABLET, FILM COATED ORAL NIGHTLY
Status: DISCONTINUED | OUTPATIENT
Start: 2021-04-20 | End: 2021-04-21 | Stop reason: HOSPADM

## 2021-04-20 RX ORDER — WARFARIN SODIUM 2 MG/1
2 TABLET ORAL DAILY
Status: DISCONTINUED | OUTPATIENT
Start: 2021-04-20 | End: 2021-04-21 | Stop reason: HOSPADM

## 2021-04-20 RX ORDER — SODIUM CHLORIDE 9 MG/ML
INJECTION, SOLUTION INTRAVENOUS CONTINUOUS
Status: DISCONTINUED | OUTPATIENT
Start: 2021-04-20 | End: 2021-04-20

## 2021-04-20 RX ORDER — FAMOTIDINE 20 MG/1
20 TABLET, FILM COATED ORAL DAILY
Status: DISCONTINUED | OUTPATIENT
Start: 2021-04-20 | End: 2021-04-21 | Stop reason: HOSPADM

## 2021-04-20 RX ORDER — SODIUM CHLORIDE 0.9 % (FLUSH) 0.9 %
5-40 SYRINGE (ML) INJECTION EVERY 12 HOURS SCHEDULED
Status: DISCONTINUED | OUTPATIENT
Start: 2021-04-20 | End: 2021-04-21 | Stop reason: HOSPADM

## 2021-04-20 RX ORDER — SODIUM CHLORIDE 0.9 % (FLUSH) 0.9 %
5-40 SYRINGE (ML) INJECTION PRN
Status: DISCONTINUED | OUTPATIENT
Start: 2021-04-20 | End: 2021-04-21 | Stop reason: HOSPADM

## 2021-04-20 RX ORDER — CARVEDILOL 6.25 MG/1
6.25 TABLET ORAL 2 TIMES DAILY
Status: DISCONTINUED | OUTPATIENT
Start: 2021-04-20 | End: 2021-04-21 | Stop reason: HOSPADM

## 2021-04-20 RX ORDER — ASPIRIN 81 MG/1
81 TABLET, CHEWABLE ORAL DAILY
Status: DISCONTINUED | OUTPATIENT
Start: 2021-04-20 | End: 2021-04-21 | Stop reason: HOSPADM

## 2021-04-20 RX ORDER — SODIUM CHLORIDE 9 MG/ML
INJECTION, SOLUTION INTRAVENOUS CONTINUOUS
Status: ACTIVE | OUTPATIENT
Start: 2021-04-20 | End: 2021-04-20

## 2021-04-20 RX ORDER — SODIUM CHLORIDE 9 MG/ML
25 INJECTION, SOLUTION INTRAVENOUS PRN
Status: DISCONTINUED | OUTPATIENT
Start: 2021-04-20 | End: 2021-04-21 | Stop reason: HOSPADM

## 2021-04-20 RX ORDER — CLOPIDOGREL BISULFATE 75 MG/1
75 TABLET ORAL DAILY
Status: DISCONTINUED | OUTPATIENT
Start: 2021-04-20 | End: 2021-04-21 | Stop reason: HOSPADM

## 2021-04-20 RX ADMIN — CARVEDILOL 6.25 MG: 6.25 TABLET, FILM COATED ORAL at 21:13

## 2021-04-20 RX ADMIN — SODIUM CHLORIDE, PRESERVATIVE FREE 10 ML: 5 INJECTION INTRAVENOUS at 21:30

## 2021-04-20 RX ADMIN — SODIUM CHLORIDE: 9 INJECTION, SOLUTION INTRAVENOUS at 18:56

## 2021-04-20 NOTE — PROGRESS NOTES
Patient admitted, consent signed, all questions answered. Pt ready for procedure. Bed in low position, call light to reach with side rails up 2 of 2. Bilateral groin hair clipped. Daughter at bedside with patient.

## 2021-04-20 NOTE — H&P
[facility]  Pre- Procedure History and Physical/Update          Patient Name:  Jessa De La Cruz  MRN:    [id number]  YOB: 1945  Date of evaluation:  4/20/2021       Please refer to the consult note / H&P completed by Dr. Johanna Boudreaux on 3/30/21 in the medical record and note that:       [x] I have examined the patient and reviewed the H&P/Consult and there are no changes to be made to the assessment or plan. [] I have examined the patient and reviewed the H&P/Consult and have noted the following changes:        Past Medical History:   Diagnosis Date    Hypertension     Type II or unspecified type diabetes mellitus without mention of complication, not stated as uncontrolled        No past surgical history on file. reports that she has never smoked. She has never used smokeless tobacco.    Prior to Admission medications    Medication Sig Start Date End Date Taking?  Authorizing Provider   warfarin (COUMADIN) 2 MG tablet Take 2 mg by mouth As directed    Historical Provider, MD   glipiZIDE (GLUCOTROL XL) 5 MG extended release tablet  11/3/19   Historical Provider, MD   carvedilol (COREG) 6.25 MG tablet Take by mouth 2 times daily  3/18/16   Historical Provider, MD   digoxin (LANOXIN) 250 MCG tablet Take 250 mcg by mouth daily \"5 times a week, skipping 2 days\" 2/15/16   Historical Provider, MD   famotidine (PEPCID) 20 MG tablet Take 20 mg by mouth 2 times daily  3/18/16   Historical Provider, MD   furosemide (LASIX) 20 MG tablet Take 20 mg by mouth daily  4/6/16   Historical Provider, MD   metFORMIN (GLUCOPHAGE) 1000 MG tablet Take 1,000 mg by mouth 2 times daily (with meals)  3/21/16   Historical Provider, MD   spironolactone (ALDACTONE) 25 MG tablet Take 25 mg by mouth daily  3/2/16   Historical Provider, MD   warfarin (COUMADIN) 2.5 MG tablet Take 2.5 mg by mouth daily \"6 days a week\" 3/2/16   Historical Provider, MD       No Known Allergies      REVIEW OF SYSTEMS:     A detailed review of system was performed as already noted and is otherwise as above. PHYSICAL EXAM:     There were no vitals filed for this visit. Constitutional and General Appearance: alert, cooperative, no distress and appears stated age  [de-identified]: PERRL, no cervical lymphadenopathy. No masses palpable. Normal oral mucosa  Respiratory:  · Normal excursion and expansion without use of accessory muscles  · Resp Auscultation: Good respiratory effort. No for increased work of breathing. On auscultation: clear to auscultation bilaterally  Cardiovascular:  · The apical impulse is not displaced  · Heart tones are crisp and normal. regular S1 and S2.  · Jugular venous pulsation Normal  · The carotid upstroke is normal in amplitude and contour without delay or bruit  · Peripheral pulses are symmetrical and full  Abdomen:  · No masses or tenderness  · Bowel sounds present  Extremities:  ·  No Cyanosis or Clubbing  ·  Lower extremity edema: No  · Skin: Warm and dry  Neurological:  · Alert and oriented. · Moves all extremities well  · No abnormalities of mood, affect, memory, mentation, or behavior are noted      Active Problems:    * No active hospital problems. *  Resolved Problems:    * No resolved hospital problems. *      Nuclear stress test: 4/8/21: Moderate sized, moderate intensity, mid and apical anterolateral  ischemia. 2. No infarction. 3. Normal left ventricular ejection fraction 77%, and normal wall  motion. Assessment:  1. SOB with abnormal stress test  2. Permanent A Fib   3. HTN  4. Obesity  5. GERD  6. Vertigo      Plan:  1. Proceed with planned cardiac cath. 2. Further orders to follow. The risks, benefits, and alternatives of the prcoedure were discussed in detail with the patient. The patient agrees to proceed with the procedure, verbalizes understanding and signed consent.      Pre Procedure Conscious Sedation Data:     ASA Class:                  [] I [] II [x] III [] IV     Mallampati Class:       [] I [] II [x] III [] Kiet Gray MD  Fellow, [facility]

## 2021-04-20 NOTE — PROGRESS NOTES
Patient received post cath to [facility] 1. Assessment obtained. Post cath pathway initiated. Right radial site with Vasc band intact. No hematoma noted. Restrictions reviewed with patient and daughter. Patient without complaints. Angiomax IV infusing at 36.4ml/hr.

## 2021-04-20 NOTE — PROGRESS NOTES
Transferred to room 2016 per cart with daughter and all belongings. Vasc band intact to right radial artery without bleeding or hematoma.

## 2021-04-20 NOTE — OP NOTE
[facility]        Date:   4/20/2021  Patient name:  Apple Machado  Date of admission:  4/20/2021 11:01 AM  MRN:   [id number]  YOB: 1945    CARDIAC CATHETERIZATION    Operators:  KLARISSA:INOCENCIO Dunlap MD    CV Fellow: Bob Aguilera M.D. Procedure performed:       [x] Left Heart Catheterization. [] Graft Angiography.  [] Left Ventriculography. [] Right Heart Catheterization. [x] Coronary Angiography. [] Aortic Valve Studies. [x] PCI:      [] Other:       Pre Procedure Conscious Sedation Data:    ASA Class:    [] I [x] II [] III [] IV    Mallampati Class:  [] I [x] II [] III [] IV      Indication:  [] STEMI      [x] + Stress test  [] ACS      [] + EKG Changes  [] Non Q MI       [] Significant Risk Factors  [] Recurrent Angina             [] Diabetes Mellitus    [] New LBBB      [] Uncontrolled HTN. [] CHF / Low EF changes     [] Abnormal CTA / Ca Score  [] Other:     Procedure:  Access:  [] Femoral artery  [x] Radial  artery       [x] Right   [] Left    Procedure: After informed consent was obtained with explanation of the risks and benefits, patient was brought to the cath lab. The access area was prepped and draped in sterile fashion. 1% lidocaine was used for local block. The artery was cannulated with 6  Fr sheath with brisk arterial blood return. The side port was frequently flushed and aspirated with normal saline. Estimated blood loss: 10 ml    Findings:   LMCA: Normal 0% stenosis. Mild calcification noted.     LAD: has mild calcification. The mid LAD 50% stenosis with focal 85%  stenosis. The D1 has ostial 80% stenosis. Lesion on Mid LAD: Mid subsection. 85% stenosis 36 mm length reduced to    0%. Pre procedure ESTELA III flow was noted. Post Procedure ESTELA III flow    was present. Good runoff was present. The lesion was diagnosed as High    Risk (C). The lesion was diffuse and eccentric. The lesion showed with    irregular contour, mild angulation and mild tortuosity. Devices used       - Runthrough NS. Number of passes: 1.       - Trek Balloon 2.5mm x 12mm. 2 inflation(s) to a max pressure of: 8    arnaldo. - Trek Balloon 2.5mm x 12mm. 3 inflation(s) to a max pressure of: 18    arnaldo. - Xience Tatyana 3.25 x 38 VERONICA. 1 inflation(s) to a max pressure of: 13    arnaldo. - Xience Alpine 3.25 x 15 VERONICA. 2 inflation(s) to a max pressure of: 12    arnaldo. Lesion on 1st Diag: Ostial.80% stenosis 12 mm length reduced to 0%. Pre    procedure ESTELA III flow was noted. Post Procedure ESTELA III flow was    present. Good runoff was present. The lesion was discrete and    eccentric. The lesion showed with irregular contour, mild angulation and    mild tortuosity. Devices used       - [employer] Prowater Flex 180 cm. Number of passes: 1.       - Trek Balloon 2.5mm x 12mm. 3 inflation(s) to a max pressure of: 12    arnaldo. - [place] 2.5 x 12 VERONICA. 2 inflation(s) to a max pressure of: 14    arnaldo. - [place] Trek 3.25mm x 15mm. 1 inflation(s)     LCx: has 20% stenosis. The OM1 is normal.     RCA: has mid 20% stenosis.      Coronary Tree      Dominance: Right             Conclusions:   One vessel coronary artery disease. Successful bifurcation PCI of mid LAD and D1 using VERONICA. Recommendations:  Medical therapy as needed. Risk factor modification. Routine Post PCI Orders. History and Risk Factors    [x] Hypertension     [] Family history of CAD  [] Hyperlipidemia     [] Cerebrovascular Disease   [] Prior MI       [] Peripheral Vascular disease   [] Prior PCI              [x] Diabetes Mellitus    [] Left Main PCI. [] Currently on Dialysis. [] Prior CABG. [] Currently smoker. [] Cardiac Arrest outside of healthcare facility. [] Yes    [x] No        Witnessed     [] Yes   [] No     Arrest after arrival of EMS  [] Yes   [] No     [] Cardiac Arrest at other Facility. [] Yes   [] No    Pre-Procedure Information.   Heart Failure       [] Yes [x] No        Class  [] I      [] II  [] III    [] IV. New Diagnosis    [] Yes  [] No    HF Type      [] Systolic   [] Diastolic          [] Unknown. Diagnostic Test:   EKG       [x] Normal   [] Abnormal    New antiarrhythmia medications:    [] Yes   [] No   New onset atrial fibrillation / Flutter     [] Yes   [] No   ECG Abnormalities:      [] V. Fib   [] Elyssa V. Tach           [] NS V. T   [] New LBBB           [] T. Inv  []  ST dev > 0.5 mm         [] PVC's freq  [] PVC's infrequent    Stress Test Performed:      [x] Yes    [] No     Type:     [] Stress Echo   [] Exercise Stress Test (no imaging)      [x] Stress Nuclear  [] Stress Imaging     Results   [] Negative   [x] Positive        [] Indeterminate  [] Unavailable     If Positive/ Risk / Extent of Ischemia:       [] Low  [x] Intermediate         [] High  [] Unavailable      Cardiac CTA Performed:     [] Yes    [x] No      Results   [] CAD   [] Non obstructive CAD      [] No CAD   [] Uncertain      [] Unknown   [] Structural Disease. Pre Procedure Medications:   [x] Yes    [] No         [] ASA  [x] Beta Blockers      [] Nitrate  [] Ca Channel Blockers      [] Ranolazine  [] Statin       [] Plavix/Others antiplatelets          Manfred Urbina MD  Fellow, [address] [facility]      I was present during entire procedure and performed all critical elements of the procedure.     Unknown MD Meenakshi

## 2021-04-21 VITALS
DIASTOLIC BLOOD PRESSURE: 67 MMHG | HEIGHT: 67 IN | BODY MASS INDEX: 36.1 KG/M2 | OXYGEN SATURATION: 97 % | RESPIRATION RATE: 20 BRPM | SYSTOLIC BLOOD PRESSURE: 134 MMHG | TEMPERATURE: 98.8 F | WEIGHT: 230 LBS | HEART RATE: 78 BPM

## 2021-04-21 LAB
ANION GAP SERPL CALCULATED.3IONS-SCNC: 10 MMOL/L (ref 9–17)
BUN BLDV-MCNC: 13 MG/DL (ref 8–23)
BUN/CREAT BLD: ABNORMAL (ref 9–20)
CALCIUM SERPL-MCNC: 8.5 MG/DL (ref 8.6–10.4)
CHLORIDE BLD-SCNC: 109 MMOL/L (ref 98–107)
CO2: 25 MMOL/L (ref 20–31)
CREAT SERPL-MCNC: 0.66 MG/DL (ref 0.5–0.9)
GFR AFRICAN AMERICAN: >60 ML/MIN
GFR NON-AFRICAN AMERICAN: >60 ML/MIN
GFR SERPL CREATININE-BSD FRML MDRD: ABNORMAL ML/MIN/{1.73_M2}
GFR SERPL CREATININE-BSD FRML MDRD: ABNORMAL ML/MIN/{1.73_M2}
GLUCOSE BLD-MCNC: 91 MG/DL (ref 70–99)
POTASSIUM SERPL-SCNC: 4 MMOL/L (ref 3.7–5.3)
SODIUM BLD-SCNC: 144 MMOL/L (ref 135–144)

## 2021-04-21 PROCEDURE — 80048 BASIC METABOLIC PNL TOTAL CA: CPT

## 2021-04-21 PROCEDURE — 36415 COLL VENOUS BLD VENIPUNCTURE: CPT

## 2021-04-21 PROCEDURE — 6370000000 HC RX 637 (ALT 250 FOR IP): Performed by: STUDENT IN AN ORGANIZED HEALTH CARE EDUCATION/TRAINING PROGRAM

## 2021-04-21 RX ORDER — ATORVASTATIN CALCIUM 40 MG/1
40 TABLET, FILM COATED ORAL NIGHTLY
Qty: 30 TABLET | Refills: 3 | Status: SHIPPED | OUTPATIENT
Start: 2021-04-21 | End: 2021-04-27 | Stop reason: SDUPTHER

## 2021-04-21 RX ORDER — CLOPIDOGREL BISULFATE 75 MG/1
75 TABLET ORAL DAILY
Qty: 30 TABLET | Refills: 3 | Status: SHIPPED | OUTPATIENT
Start: 2021-04-22 | End: 2021-04-27 | Stop reason: SDUPTHER

## 2021-04-21 RX ORDER — ASPIRIN 81 MG/1
81 TABLET, CHEWABLE ORAL DAILY
Qty: 30 TABLET | Refills: 3 | Status: SHIPPED | OUTPATIENT
Start: 2021-04-22 | End: 2021-10-20

## 2021-04-21 RX ADMIN — CARVEDILOL 6.25 MG: 6.25 TABLET, FILM COATED ORAL at 08:28

## 2021-04-21 RX ADMIN — CLOPIDOGREL 75 MG: 75 TABLET, FILM COATED ORAL at 08:29

## 2021-04-21 NOTE — DISCHARGE INSTR - DIET
 Good nutrition is important when healing from an illness, injury, or surgery. Follow any nutrition recommendations given to you during your hospital stay.  If you were given an oral nutrition supplement while in the hospital, continue to take this supplement at home. You can take it with meals, in-between meals, and/or before bedtime. These supplements can be purchased at most local grocery stores, pharmacies, and chain super-stores.  If you have any questions about your diet or nutrition, call the hospital and ask for the dietitian. 7 Tips for Eating Healthy When [facility] All the Time    Make quick meals on busy nights. Try recipes that are simple to make and easy to clean up, like pasta, soups, or casseroles. These dishes can often be made ahead of time and are easy to reheat when you're short on time. Buy foods that last.  Choose fresh foods, such as onions, garlic, and potatoes. You can also reach for frozen, canned, and dried foods. Foods like these last and can help you pull a healthy meal together quickly. Sobia Ordonez something new. Try checking out cookbooks from [employer]. Or search for new recipes online. You can also change the ingredients in an old favorite recipe. Or switch the seasonings to create something new. Try theme nights. Try \"Taco Tuesday. \" Do \"Meatless Monday\" for a vegetarian meal each week. And save \"Leftovers Night\" for days when you don't want to cook. Let your favorite dishes guide you. Then make them again every few weeks. Sobia Ordonez with a friend. Maybe you know someone who's feeling the same way about healthy eating. Pick a recipe and schedule a night to make it \"together. \" You can also video call while you cook or eat. Share food with other people. If you like cooking and baking, you can share what you've made. Split up what you've made and keep only what you want to eat. You can wrap up the rest to share with a friend, neighbor, or family member. Aim for balance, variety, and moderation. On most days, eat from each food groupgrains, protein foods, vegetables, fruits, and dairy. And choose different foods from each group. For example, if you often eat apples, try reaching for a banana instead. All foods, if you eat them in moderation, can be part of healthy eating. Current as of: December 17, 2020               Content Version: 12.8  © 2006-2021 [employer]. Care instructions adapted under license by [facility] ([facility]). If you have questions about a medical condition or this instruction, always ask your healthcare professional. [place] [address] any warranty or liability for your use of this information. Learning About Carbohydrate (Carb) Counting and Eating Out When You Have Diabetes  Why plan your meals? Meal planning can be a key part of managing diabetes. Planning meals and snacks with the right balance of carbohydrate, protein, and fat can help you keep your blood sugar at the target level you set with your doctor. You don't have to eat special foods. You can eat what your family eats, including sweets once in a while. But you do have to pay attention to how often you eat and how much you eat of certain foods. You may want to work with a dietitian or a certified diabetes educator. He or she can give you tips and meal ideas and can answer your questions about meal planning. This health professional can also help you reach a healthy weight if that is one of your goals. What should you know about eating carbs? Managing the amount of carbohydrate (carbs) you eat is an important part of healthy meals when you have diabetes. Carbohydrate is found in many foods. · Learn which foods have carbs. And learn the amounts of carbs in different foods. ? Bread, cereal, pasta, and rice have about 15 grams of carbs in a serving.  A serving is 1 slice of bread (1 ounce), ½ cup of cooked cereal, or 1/3 cup of cooked pasta or rice.  ? Fruits have 15 grams of carbs in a serving. A serving is 1 small fresh fruit, such as an apple or orange; ½ of a banana; ½ cup of cooked or canned fruit; ½ cup of fruit juice; 1 cup of melon or raspberries; or 2 tablespoons of dried fruit. ? Milk and no-sugar-added yogurt have 15 grams of carbs in a serving. A serving is 1 cup of milk or 2/3 cup of no-sugar-added yogurt. ? Starchy vegetables have 15 grams of carbs in a serving. A serving is ½ cup of mashed potatoes or sweet potato; 1 cup winter squash; ½ of a small baked potato; ½ cup of cooked beans; or ½ cup cooked corn or green peas. · Learn how much carbs to eat each day and at each meal. A dietitian or CDE can teach you how to keep track of the amount of carbs you eat. This is called carbohydrate counting. · If you are not sure how to count carbohydrate grams, use the Plate Method to plan meals. It is a good, quick way to make sure that you have a balanced meal. It also helps you spread carbs throughout the day. ? Divide your plate by types of foods. Put non-starchy vegetables on half the plate, meat or other protein food on one-quarter of the plate, and a grain or starchy vegetable in the final quarter of the plate. To this you can add a small piece of fruit and 1 cup of milk or yogurt, depending on how many carbs you are supposed to eat at a meal.  · Try to eat about the same amount of carbs at each meal. Do not \"save up\" your daily allowance of carbs to eat at one meal.  · Proteins have very little or no carbs per serving. Examples of proteins are beef, chicken, turkey, fish, eggs, tofu, cheese, cottage cheese, and peanut butter. A serving size of meat is 3 ounces, which is about the size of a deck of cards. Examples of meat substitute serving sizes (equal to 1 ounce of meat) are 1/4 cup of cottage cheese, 1 egg, 1 tablespoon of peanut butter, and ½ cup of tofu. How can you eat out and still eat healthy?   · Learn to estimate the serving sizes of foods that have carbohydrate. If you measure food at home, it will be easier to estimate the amount in a serving of restaurant food. · If the meal you order has too much carbohydrate (such as potatoes, corn, or baked beans), ask to have a low-carbohydrate food instead. Ask for a salad or green vegetables. · If you use insulin, check your blood sugar before and after eating out to help you plan how much to eat in the future. · If you eat more carbohydrate at a meal than you had planned, take a walk or do other exercise. This will help lower your blood sugar. What are some tips for eating healthy? · Limit saturated fat, such as the fat from meat and dairy products. This is a healthy choice because people who have diabetes are at higher risk of heart disease. So choose lean cuts of meat and nonfat or low-fat dairy products. Use olive or canola oil instead of butter or shortening when cooking. · Don't skip meals. Your blood sugar may drop too low if you skip meals and take insulin or certain medicines for diabetes. · Check with your doctor before you drink alcohol. Alcohol can cause your blood sugar to drop too low. Alcohol can also cause a bad reaction if you take certain diabetes medicines. Follow-up care is a key part of your treatment and safety. Be sure to make and go to all appointments, and call your doctor if you are having problems. It's also a good idea to know your test results and keep a list of the medicines you take. Where can you learn more? Go to https://pSividaken.LookStat. org and sign in to your [facility] account. Enter V374 in the [facility] box to learn more about \"Learning About Carbohydrate (Carb) Counting and Eating Out When You Have Diabetes. \"     If you do not have an account, please click on the \"Sign Up Now\" link. Current as of: August 31, 2020               Content Version: 12.8  © 7809-5636 [employer].    Care instructions adapted under license by [facility] ([facility]). If you have questions about a medical condition or this instruction, always ask your healthcare professional. [place] [address] any warranty or liability for your use of this information.

## 2021-04-21 NOTE — DISCHARGE SUMMARY
[facility]  Discharge Note                 Name:  Felix Sousa  YOB: 1945  Social Security Number:  [id number]  Medical Record Number:  [id number]    Date of Admission:  4/20/2021  Date of Discharge:  4/21/2021    Admitting physician: Dajuan Del Cid MD    Discharge Attending: Sudarshan Ramos CNP  Primary Care Physician: Marek Melgar DO  Consultants: Cardiology  Discharge to home in stable condition    HOSPITAL ADMISSION PROBLEM LIST:  Patient Active Problem List   Diagnosis    Background diabetic retinopathy ([address]    Cataract    Myopia of both eyes with astigmatism and presbyopia    Dizziness    Vertigo    Balance problem    Peripheral polyneuropathy    Diabetes mellitus type II, controlled ([address]    Atrial fibrillation ([address]    Current use of long term anticoagulation    Cerebral ischemia    Hyperlipidemia    GERD (gastroesophageal reflux disease)    Essential hypertension    Arthritis of knee    Carotid stenosis, asymptomatic, right    VBI (vertebrobasilar insufficiency)    S/P angioplasty with stent         Procedures:cardiac catheterization    HOSPITAL COURSE :           The patient was admitted for: Chest pain/positive OP stress test  Hospital Procedures if any: cath w/ stent placement  Medications changes recommendation: See List  Follow Up Plan: F/U in [address] [place] [address] in 1 week      Discharge exam:   Vitals:    04/21/21 0828   BP: 134/67   Pulse: 78   Resp:    Temp:    SpO2:      Neuro: normal  Chest: Clear to ausculation. No wheezing. Cardiac: Regular rate. s1 and s2 auscultated. No murmur noted. Abdomen/groin: soft, non-tender, without masses or organomegaly  Lower extremity edema: none  Right radial post cath site CDI.     Discharge Medications:   Hu Gonzales   Home Medication Instructions JAL:[id number]    Printed on:04/21/21 1057   Medication Information                      aspirin 81 MG chewable tablet  Take 1 tablet by mouth daily atorvastatin (LIPITOR) 40 MG tablet  Take 1 tablet by mouth nightly             carvedilol (COREG) 6.25 MG tablet  Take by mouth 2 times daily              clopidogrel (PLAVIX) 75 MG tablet  Take 1 tablet by mouth daily             famotidine (PEPCID) 20 MG tablet  Take 20 mg by mouth daily              furosemide (LASIX) 20 MG tablet  Take 20 mg by mouth daily              glipiZIDE (GLUCOTROL XL) 5 MG extended release tablet               metFORMIN (GLUCOPHAGE) 1000 MG tablet  Take 1,000 mg by mouth 2 times daily (with meals)              warfarin (COUMADIN) 2 MG tablet  Take 2 mg by mouth As directed             warfarin (COUMADIN) 2.5 MG tablet  Take 2.5 mg by mouth daily \"5 days a week\"                Nuclear stress test: 4/8/21: Moderate sized, moderate intensity, mid and apical anterolateral  ischemia. 2. No infarction. 3. Normal left ventricular ejection fraction 77%, and normal wall  motion.     Assessment:  1. SOB with abnormal stress test  2. Permanent A Fib   3. HTN  4. Obesity  5. GERD  6. Vertigo        Plan:  1. Proceed with planned cardiac cath. 2. Further orders to follow    Cardiac Cath 4/20/21  Findings:   LMCA: Normal 0% stenosis. Mild calcification noted.     LAD: has mild calcification. The mid LAD 50% stenosis with focal 85%  stenosis. The D1 has ostial 80% stenosis.      Lesion on Mid LAD: Mid subsection. 85% stenosis 36 mm length reduced to    0%. Pre procedure ESTELA III flow was noted. Post Procedure ESTELA III flow    was present. Good runoff was present. The lesion was diagnosed as High    Risk (C). The lesion was diffuse and eccentric. The lesion showed with    irregular contour, mild angulation and mild tortuosity.      Devices used       - Runthrough NS. Number of passes: 1.       - Trek Balloon 2.5mm x 12mm. 2 inflation(s) to a max pressure of: 8    arnaldo.       - Trek Balloon 2.5mm x 12mm. 3 inflation(s) to a max pressure of: 18    arnaldo.       - Xience Tatyana 3.25 x 38 VERONICA.  1 inflation(s) to a max pressure of: 13    arnaldo.       - Xience Alpine 3.25 x 15 VERONICA. 2 inflation(s) to a max pressure of: 12    arnaldo.       Lesion on 1st Diag: Ostial.80% stenosis 12 mm length reduced to 0%. Pre    procedure ESTELA III flow was noted. Post Procedure ESTELA III flow was    present. Good runoff was present. The lesion was discrete and    eccentric. The lesion showed with irregular contour, mild angulation and    mild tortuosity.      Devices used       - [employer] Prowater Flex 180 cm. Number of passes: 1.       - Trek Balloon 2.5mm x 12mm. 3 inflation(s) to a max pressure of: 12    arnaldo.       - Xience Tatyana 2.5 x 12 VERONICA. 2 inflation(s) to a max pressure of: 14    arnaldo.       - [place] Trek 3.25mm x 15mm. 1 inflation(s)     LCx: has 20% stenosis. The OM1 is normal.     RCA: has mid 20% stenosis.      Coronary Tree      Dominance: Right                 Conclusions:   One vessel coronary artery disease.   Successful bifurcation PCI of mid LAD and D1 using VERONICA.          Recommendations:  Medical therapy as needed.   Risk factor modification.   Routine Post PCI Orders.          Coronary Discharge Core Measure: Please indicate the medication given by X, and if not the reasons not given:    Not Given Reason  Given      Beta Blockers x      ACE-I Reassess as op d/t hypotension      Statins x      ASA x       OAP (Plavix/Effient/Brilinta) Plavix - written RX given & sent to pharmacy    SL Nitro   x           Discussed with patient and nursing. Medications and discharge instructions reviewed with patient and nursing. Discussed in detail with patient post cath POC including but not limited to medications, diet, exercise, right radial artery site care, and follow-up. Questions and concerns addressed. OK for discharge home today. Continue PO Plavix, ASA, & Coumadin for 1 month and then will stop ASA.  F/U in office as scheduled    Electronically signed by TED Zhang CNP on 4/21/2021 at 10:55 [facility]      [phone]

## 2021-04-21 NOTE — DISCHARGE INSTR - ACTIVITY
No pulling or pushing with rt wrist.   No  anything greater than 5 lb's for 3/4 days. Continue moving wrist around.

## 2021-04-21 NOTE — PLAN OF CARE
Problem: Falls - Risk of:  Goal: Will remain free from falls  Description: Will remain free from falls  Outcome: Completed  Goal: Absence of physical injury  Description: Absence of physical injury  Outcome: Completed     Problem: Pain:  Goal: Pain level will decrease  Description: Pain level will decrease  Outcome: Completed  Goal: Control of acute pain  Description: Control of acute pain  Outcome: Completed  Goal: Control of chronic pain  Description: Control of chronic pain  Outcome: Completed     Problem: Cardiac:  Goal: Ability to maintain vital signs within normal range will improve  Description: Ability to maintain vital signs within normal range will improve  Outcome: Completed  Goal: Cardiovascular alteration will improve  Description: Cardiovascular alteration will improve  Outcome: Completed     Problem: Health Behavior:  Goal: Will modify at least one risk factor affecting health status  Description: Will modify at least one risk factor affecting health status  Outcome: Completed  Goal: Identification of resources available to assist in meeting health care needs will improve  Description: Identification of resources available to assist in meeting health care needs will improve  Outcome: Completed  Goal: Ability to identify and utilize available resources and services will improve  Description: Ability to identify and utilize available resources and services will improve  Outcome: Completed  Goal: Ability to manage health-related needs will improve  Outcome: Completed     Problem: Physical Regulation:  Goal: Complications related to the disease process, condition or treatment will be avoided or minimized  Description: Complications related to the disease process, condition or treatment will be avoided or minimized  Outcome: Completed  Goal: Diagnostic test results will improve  Description: Diagnostic test results will improve  Outcome: Completed     Problem:  Activity:  Goal: Risk for activity

## 2021-04-21 NOTE — CARE COORDINATION
Jason Wilkerson MD  Admit Date: 2021  6:40 PM  MRN: [id number]  Account: [de-identified]                     : 1945  Discharge Date: 2021      Disposition: home independently    Benita Camilo RN

## 2021-04-27 ENCOUNTER — OFFICE VISIT (OUTPATIENT)
Dept: CARDIOLOGY | Age: 76
End: 2021-04-27
Payer: MEDICARE

## 2021-04-27 VITALS
WEIGHT: 225 LBS | DIASTOLIC BLOOD PRESSURE: 78 MMHG | HEART RATE: 94 BPM | SYSTOLIC BLOOD PRESSURE: 140 MMHG | BODY MASS INDEX: 35.31 KG/M2 | HEIGHT: 67 IN

## 2021-04-27 DIAGNOSIS — Z95.5 S/P CORONARY ARTERY STENT PLACEMENT: ICD-10-CM

## 2021-04-27 DIAGNOSIS — Z98.890 S/P CARDIAC CATH: ICD-10-CM

## 2021-04-27 DIAGNOSIS — Z95.5 S/P PRIMARY ANGIOPLASTY WITH CORONARY STENT: ICD-10-CM

## 2021-04-27 DIAGNOSIS — E78.2 MIXED HYPERLIPIDEMIA: ICD-10-CM

## 2021-04-27 DIAGNOSIS — E66.09 CLASS 1 OBESITY DUE TO EXCESS CALORIES WITH SERIOUS COMORBIDITY IN ADULT, UNSPECIFIED BMI: ICD-10-CM

## 2021-04-27 DIAGNOSIS — E11.9 CONTROLLED TYPE 2 DIABETES MELLITUS WITHOUT COMPLICATION, UNSPECIFIED WHETHER LONG TERM INSULIN USE (HCC): ICD-10-CM

## 2021-04-27 DIAGNOSIS — I48.21 PERMANENT ATRIAL FIBRILLATION (HCC): Primary | ICD-10-CM

## 2021-04-27 DIAGNOSIS — I10 HYPERTENSION, ESSENTIAL: ICD-10-CM

## 2021-04-27 DIAGNOSIS — I25.10 CORONARY ARTERY DISEASE INVOLVING NATIVE CORONARY ARTERY OF NATIVE HEART WITHOUT ANGINA PECTORIS: ICD-10-CM

## 2021-04-27 PROCEDURE — 1123F ACP DISCUSS/DSCN MKR DOCD: CPT | Performed by: INTERNAL MEDICINE

## 2021-04-27 PROCEDURE — 1090F PRES/ABSN URINE INCON ASSESS: CPT | Performed by: INTERNAL MEDICINE

## 2021-04-27 PROCEDURE — 93005 ELECTROCARDIOGRAM TRACING: CPT | Performed by: INTERNAL MEDICINE

## 2021-04-27 PROCEDURE — 93010 ELECTROCARDIOGRAM REPORT: CPT | Performed by: INTERNAL MEDICINE

## 2021-04-27 PROCEDURE — 4040F PNEUMOC VAC/ADMIN/RCVD: CPT | Performed by: INTERNAL MEDICINE

## 2021-04-27 PROCEDURE — G8400 PT W/DXA NO RESULTS DOC: HCPCS | Performed by: INTERNAL MEDICINE

## 2021-04-27 PROCEDURE — G8427 DOCREV CUR MEDS BY ELIG CLIN: HCPCS | Performed by: INTERNAL MEDICINE

## 2021-04-27 PROCEDURE — 1036F TOBACCO NON-USER: CPT | Performed by: INTERNAL MEDICINE

## 2021-04-27 PROCEDURE — 99214 OFFICE O/P EST MOD 30 MIN: CPT | Performed by: INTERNAL MEDICINE

## 2021-04-27 PROCEDURE — G8417 CALC BMI ABV UP PARAM F/U: HCPCS | Performed by: INTERNAL MEDICINE

## 2021-04-27 RX ORDER — ATORVASTATIN CALCIUM 40 MG/1
40 TABLET, FILM COATED ORAL NIGHTLY
Qty: 90 TABLET | Refills: 3 | Status: SHIPPED | OUTPATIENT
Start: 2021-04-27 | End: 2022-05-18

## 2021-04-27 RX ORDER — CLOPIDOGREL BISULFATE 75 MG/1
75 TABLET ORAL DAILY
Qty: 90 TABLET | Refills: 3 | Status: SHIPPED | OUTPATIENT
Start: 2021-04-27 | End: 2022-05-18

## 2021-04-27 NOTE — PROGRESS NOTES
Today's Date: 4/27/2021  Patient's Name: Sammie Hamiltno  Patient's age: 64 y.o., 1945    HPI and CC:  Patient is doing well from a cardiac standpoint. Good functional capacity with no significant change in functional capacity. No chest pain, no dyspnea, no PND, no syncope or pre-syncope, no orthopnea. No symptoms of CHF or angina/chest pain. S/p cath with Henri to LAD, feels muuch better. Past Medical History:   has a past medical history of Dizzy, Hypertension, and Type II or unspecified type diabetes mellitus without mention of complication, not stated as uncontrolled. Past Surgical History:   has a past surgical history that includes Hysterectomy; Cardiac catheterization (04/20/2021); joint replacement; and Colonoscopy. Home Medications:  Prior to Admission medications    Medication Sig Start Date End Date Taking?  Authorizing Provider   aspirin 81 MG chewable tablet Take 1 tablet by mouth daily 4/22/21  Yes TED Corrae CNP   atorvastatin (LIPITOR) 40 MG tablet Take 1 tablet by mouth nightly 4/21/21  Yes TED Correa CNP   clopidogrel (PLAVIX) 75 MG tablet Take 1 tablet by mouth daily 4/22/21  Yes TED Correa CNP   warfarin (COUMADIN) 2 MG tablet Take 2 mg by mouth As directed   Yes Historical Provider, MD   glipiZIDE (GLUCOTROL XL) 5 MG extended release tablet  11/3/19  Yes Historical Provider, MD   carvedilol (COREG) 6.25 MG tablet Take by mouth 2 times daily  3/18/16  Yes Historical Provider, MD   famotidine (PEPCID) 20 MG tablet Take 20 mg by mouth daily  3/18/16  Yes Historical Provider, MD   furosemide (LASIX) 20 MG tablet Take 20 mg by mouth daily  4/6/16  Yes Historical Provider, MD   metFORMIN (GLUCOPHAGE) 1000 MG tablet Take 1,000 mg by mouth 2 times daily (with meals)  3/21/16  Yes Historical Provider, MD   warfarin (COUMADIN) 2.5 MG tablet Take 2.5 mg by mouth daily \"5 days a week\" 3/2/16  Yes Historical Provider, MD       Allergies: Patient has no known allergies. Social History:   reports that she has never smoked. She has never used smokeless tobacco. She reports current alcohol use. She reports that she does not use drugs. Family History: family history includes Other in her mother. No h/o sudden cardiac death. No for premature CAD    REVIEW OF SYSTEMS:    · Constitutional: there has been no unanticipated weight loss. There's been No change in energy level, No change in activity level. · Eyes: No visual changes or diplopia. No scleral icterus. · ENT: No Headaches, hearing loss or vertigo. No mouth sores or sore throat. · Cardiovascular: see above  · Respiratory: see above  · Gastrointestinal: No abdominal pain, appetite loss, blood in stools. · Genitourinary: No dysuria, trouble voiding, or hematuria. · Musculoskeletal:  Reports arthritis  · Integumentary: No rash or pruritis. · Neurological: No headache or diplopia. No tingling  · Psychiatric: No anxiety, or depression. · Endocrine: No temperature intolerance. · Hematologic/Lymphatic: No abnormal bruising or bleeding, blood clots or swollen lymph nodes. · Allergic/Immunologic: No nasal congestion or hives. PHYSICAL EXAM:      BP (!) 159/79   Pulse 94   Ht 5' 7\" (1.702 m)   Wt 225 lb (102.1 kg)   BMI 35.24 kg/m²   General appearance: alert and cooperative with exam  HEENT: Head: Normocephalic, no lesions, without obvious abnormality.   Eyes: PERRL, EOMI  Ears: Not obvious deformations or lack of hearing  Neck: no carotid bruit, no JVD  Lungs: clear to auscultation bilaterally  Heart: Irregular rate and rhythm, S1, S2 normal, no murmur, click, rub or gallop  Abdomen: soft, non-tender; bowel sounds normal; no masses,  no organomegaly  Extremities: extremities normal, atraumatic, no cyanosis or edema  Neurologic: Mental status: Alert, oriented, thought content appropriate  Skin: WNL for age and condition, no obvious rashes or leasions      Cardiac Data:  EKG: Atrial fibrillation, NS ST-T changes    Stress: 4/8/21  IMPRESSION:  1. Moderate sized, moderate intensity, mid and apical anterolateral  ischemia. 2. No infarction. 3. Normal left ventricular ejection fraction 77%, and normal wall  Motion. Echo 4/8/21  Normal left ventricular diameter. Borderline left ventricular hypertrophy. Left ventricular systolic function is normal. Left ventricular ejection  fraction 65 %. Evidence of diastolic dysfunction. Left atrium is mildly dilated. Right atrial dilatation. Right ventricular dilatation with normal systolic function. Aortic valve sclerosis without stenosis. Mild aortic insufficiency. Mitral annular calcification. Mitral Valve Prolapse of Anterior MV leaflet. Mild to moderate mitral regurgitation. Mild tricuspid regurgitation. Estimated right ventricular systolic pressure  is 35 mmHg. Labs:     CBC: No results for input(s): WBC, HGB, HCT, PLT in the last 72 hours. BMP: No results for input(s): NA, K, CO2, BUN, CREATININE, LABGLOM, GLUCOSE in the last 72 hours. PT/INR: No results for input(s): PROTIME, INR in the last 72 hours. FASTING LIPID PANEL:No results found for: HDL, LDLDIRECT, LDLCALC, TRIG  LIVER PROFILE:No results for input(s): AST, ALT, LABALBU in the last 72 hours. Assessment and plan:    S/p cath with VERONICA to LAD on 4/20/21- start cardiac rehab  -H/o permanent Afib for 25 years- on coumadin. On coreg and digoxin.  -Dyspnea with abnormal ECG. Will obtain TTE and lexiscan stress test.  -HTN- failry well controlled at this time. Continue current therapy.   -Obesity - encouraged diet, exercise, and discussed weight loss extensively. -DM-Management per PCP  -GERD- on famotidine.  -Vertigo-dizziness with any movement and eye movement. managed by neurology  -Labyrinthitis- 6/2020  -RTC 6 months. Thank you for allowing me to participate in the care of this patient, please do not hesitate to call if you have any questions.     Alexey Bryant, DO [facility]  [facility]. [facility]  [phone]

## 2021-06-15 ENCOUNTER — OFFICE VISIT (OUTPATIENT)
Dept: OPTOMETRY | Age: 76
End: 2021-06-15
Payer: MEDICARE

## 2021-06-15 DIAGNOSIS — H53.2 DIPLOPIA: ICD-10-CM

## 2021-06-15 DIAGNOSIS — E11.9 NON-INSULIN DEPENDENT TYPE 2 DIABETES MELLITUS (HCC): Primary | ICD-10-CM

## 2021-06-15 DIAGNOSIS — H53.8 BLURRED VISION, BILATERAL: ICD-10-CM

## 2021-06-15 DIAGNOSIS — H25.13 NUCLEAR SCLEROSIS OF BOTH EYES: ICD-10-CM

## 2021-06-15 PROCEDURE — 1123F ACP DISCUSS/DSCN MKR DOCD: CPT | Performed by: OPTOMETRIST

## 2021-06-15 PROCEDURE — G8417 CALC BMI ABV UP PARAM F/U: HCPCS | Performed by: OPTOMETRIST

## 2021-06-15 PROCEDURE — 1036F TOBACCO NON-USER: CPT | Performed by: OPTOMETRIST

## 2021-06-15 PROCEDURE — 99214 OFFICE O/P EST MOD 30 MIN: CPT | Performed by: OPTOMETRIST

## 2021-06-15 PROCEDURE — G8400 PT W/DXA NO RESULTS DOC: HCPCS | Performed by: OPTOMETRIST

## 2021-06-15 PROCEDURE — G8427 DOCREV CUR MEDS BY ELIG CLIN: HCPCS | Performed by: OPTOMETRIST

## 2021-06-15 PROCEDURE — 4040F PNEUMOC VAC/ADMIN/RCVD: CPT | Performed by: OPTOMETRIST

## 2021-06-15 PROCEDURE — 92250 FUNDUS PHOTOGRAPHY W/I&R: CPT | Performed by: OPTOMETRIST

## 2021-06-15 PROCEDURE — 1090F PRES/ABSN URINE INCON ASSESS: CPT | Performed by: OPTOMETRIST

## 2021-06-15 RX ORDER — TROPICAMIDE 10 MG/ML
1 SOLUTION/ DROPS OPHTHALMIC ONCE
Status: COMPLETED | OUTPATIENT
Start: 2021-06-15 | End: 2021-06-15

## 2021-06-15 RX ADMIN — TROPICAMIDE 1 DROP: 10 SOLUTION/ DROPS OPHTHALMIC at 13:46

## 2021-06-15 ASSESSMENT — REFRACTION_MANIFEST
OD_SPHERE: -0.25
OD_ADD: +2.50
OS_AXIS: 020
OD_AXIS: 174
OD_VPRISM: .5 BU
OS_ADD: +2.50
OD_CYLINDER: -2.00
OS_CYLINDER: -1.75
OS_SPHERE: +0.50

## 2021-06-15 ASSESSMENT — REFRACTION_FINALRX: OD_VPRISM: .5 BU

## 2021-06-15 ASSESSMENT — TONOMETRY
OD_IOP_MMHG: 15
OS_IOP_MMHG: 16
IOP_METHOD: NON-CONTACT AIR PUFF

## 2021-06-15 ASSESSMENT — VISUAL ACUITY
OS_CC+: -1
CORRECTION_TYPE: GLASSES
OD_CC: 20/30 OU
OS_CC: 20/30
OD_CC+: -1
METHOD: SNELLEN - LINEAR

## 2021-06-15 ASSESSMENT — REFRACTION_WEARINGRX
OS_SPHERE: +0.25
OS_CYLINDER: -1.75
OD_SPHERE: -0.75
OD_ADD: +2.50
OD_AXIS: 174
OD_CYLINDER: -2.00
OS_AXIS: 020
OS_ADD: +2.50
SPECS_TYPE: PAL

## 2021-06-15 ASSESSMENT — ENCOUNTER SYMPTOMS
RESPIRATORY NEGATIVE: 0
GASTROINTESTINAL NEGATIVE: 0
EYES NEGATIVE: 0
ALLERGIC/IMMUNOLOGIC NEGATIVE: 0

## 2021-06-15 ASSESSMENT — SLIT LAMP EXAM - LIDS
COMMENTS: NORMAL
COMMENTS: NORMAL

## 2021-06-15 ASSESSMENT — KERATOMETRY
METHOD_AUTO_MANUAL: AUTOMATED
OD_AXISANGLE2_DEGREES: 003
OD_K1POWER_DIOPTERS: 41.25
OD_AXISANGLE_DEGREES: 093
OD_K2POWER_DIOPTERS: 43.50

## 2021-06-16 ENCOUNTER — TELEPHONE (OUTPATIENT)
Dept: CARDIOLOGY | Age: 76
End: 2021-06-16

## 2021-06-16 NOTE — TELEPHONE ENCOUNTER
Discontinue ASA  Continue plavix  On coumadin. Target INR now to be 2-2.5. Update coumadin clinic or PCP for this goal. If no recent INR checked, recommend INR check now.  Patient should have standing order for INR checks

## 2021-06-16 NOTE — TELEPHONE ENCOUNTER
Left message for patient to call office with a detailed message if she is having any active bleeding to be seen in ER

## 2021-06-16 NOTE — TELEPHONE ENCOUNTER
Pt called with sx of bloody nose from 8pm to 2 am that she needed to wipe off and on or it would drip. Pt denies any gushes or flow to it. Pt is on plavix-aspirin and coumadin. Pt was coming to [facility] [place] but will start seeing Dr Paz Borrero in [facility] starting with her next visit.     [phone]

## 2021-10-20 ENCOUNTER — OFFICE VISIT (OUTPATIENT)
Dept: CARDIOLOGY | Age: 76
End: 2021-10-20
Payer: MEDICARE

## 2021-10-20 VITALS
SYSTOLIC BLOOD PRESSURE: 135 MMHG | DIASTOLIC BLOOD PRESSURE: 66 MMHG | HEIGHT: 67 IN | HEART RATE: 79 BPM | WEIGHT: 225.2 LBS | BODY MASS INDEX: 35.35 KG/M2

## 2021-10-20 DIAGNOSIS — I10 ESSENTIAL HYPERTENSION: ICD-10-CM

## 2021-10-20 DIAGNOSIS — I25.10 CORONARY ARTERY DISEASE INVOLVING NATIVE CORONARY ARTERY OF NATIVE HEART WITHOUT ANGINA PECTORIS: ICD-10-CM

## 2021-10-20 DIAGNOSIS — I50.32 CHRONIC DIASTOLIC CHF (CONGESTIVE HEART FAILURE) (HCC): ICD-10-CM

## 2021-10-20 DIAGNOSIS — I48.21 PERMANENT ATRIAL FIBRILLATION (HCC): Primary | ICD-10-CM

## 2021-10-20 PROCEDURE — 99212 OFFICE O/P EST SF 10 MIN: CPT | Performed by: INTERNAL MEDICINE

## 2021-10-20 PROCEDURE — G8417 CALC BMI ABV UP PARAM F/U: HCPCS | Performed by: INTERNAL MEDICINE

## 2021-10-20 PROCEDURE — 1123F ACP DISCUSS/DSCN MKR DOCD: CPT | Performed by: INTERNAL MEDICINE

## 2021-10-20 PROCEDURE — G8427 DOCREV CUR MEDS BY ELIG CLIN: HCPCS | Performed by: INTERNAL MEDICINE

## 2021-10-20 PROCEDURE — 99214 OFFICE O/P EST MOD 30 MIN: CPT | Performed by: INTERNAL MEDICINE

## 2021-10-20 PROCEDURE — 93010 ELECTROCARDIOGRAM REPORT: CPT | Performed by: INTERNAL MEDICINE

## 2021-10-20 PROCEDURE — 1090F PRES/ABSN URINE INCON ASSESS: CPT | Performed by: INTERNAL MEDICINE

## 2021-10-20 PROCEDURE — 1036F TOBACCO NON-USER: CPT | Performed by: INTERNAL MEDICINE

## 2021-10-20 PROCEDURE — G8400 PT W/DXA NO RESULTS DOC: HCPCS | Performed by: INTERNAL MEDICINE

## 2021-10-20 PROCEDURE — G8484 FLU IMMUNIZE NO ADMIN: HCPCS | Performed by: INTERNAL MEDICINE

## 2021-10-20 PROCEDURE — 4040F PNEUMOC VAC/ADMIN/RCVD: CPT | Performed by: INTERNAL MEDICINE

## 2021-10-20 PROCEDURE — 93005 ELECTROCARDIOGRAM TRACING: CPT | Performed by: INTERNAL MEDICINE

## 2021-10-20 RX ORDER — CITALOPRAM 10 MG/1
10 TABLET ORAL DAILY
COMMUNITY
Start: 2021-08-16 | End: 2022-11-02

## 2021-10-20 NOTE — PROGRESS NOTES
Today's Date: 10/20/2021  Patient's Name: Ángel Tate  Patient's age: 64 y.o., 1945    Subjective:  Ángel Tate is being seen in clinic today regarding   Chief Complaint   Patient presents with    6 Month Follow-Up    Atrial Fibrillation   CAD  HTN      she is here for follow up. She denies any chest pain. She has dyspnea on exertion. No PND, no syncope or pre-syncope, no orthopnea. She denies any bleeding. Past Medical History:   has a past medical history of Dizzy, Hypertension, and Type II or unspecified type diabetes mellitus without mention of complication, not stated as uncontrolled. Past Surgical History:   has a past surgical history that includes Hysterectomy; Cardiac catheterization (04/20/2021); joint replacement; and Colonoscopy. Home Medications:  Prior to Admission medications    Medication Sig Start Date End Date Taking?  Authorizing Provider   atorvastatin (LIPITOR) 40 MG tablet Take 1 tablet by mouth nightly 4/27/21   Alexey Bryant, DO   clopidogrel (PLAVIX) 75 MG tablet Take 1 tablet by mouth daily 4/27/21   Alexey Bryant, DO   aspirin 81 MG chewable tablet Take 1 tablet by mouth daily 4/22/21   Walker Morse APRN - CNP   warfarin (COUMADIN) 2 MG tablet Take 2 mg by mouth As directed    Historical Provider, MD   glipiZIDE (GLUCOTROL XL) 5 MG extended release tablet  11/3/19   Historical Provider, MD   carvedilol (COREG) 6.25 MG tablet Take by mouth 2 times daily  3/18/16   Historical Provider, MD   famotidine (PEPCID) 20 MG tablet Take 20 mg by mouth daily  3/18/16   Historical Provider, MD   furosemide (LASIX) 20 MG tablet Take 20 mg by mouth daily  4/6/16   Historical Provider, MD   metFORMIN (GLUCOPHAGE) 1000 MG tablet Take 1,000 mg by mouth 2 times daily (with meals)  3/21/16   Historical Provider, MD   warfarin (COUMADIN) 2.5 MG tablet Take 2.5 mg by mouth daily \"5 days a week\" 3/2/16   Historical Provider, MD       Allergies:  Patient has no known allergies. Social History:   reports that she has never smoked. She has never used smokeless tobacco. She reports current alcohol use. She reports that she does not use drugs. Family History: family history includes Other in her mother. No h/o sudden cardiac death. No for premature CAD    REVIEW OF SYSTEMS:    · Constitutional: there has been no unanticipated weight loss. There's been No change in energy level, No change in activity level. · Eyes: No visual changes or diplopia. No scleral icterus. · ENT: No Headaches, hearing loss or vertigo. No mouth sores or sore throat. · Cardiovascular: see above  · Respiratory: see above  · Gastrointestinal: No abdominal pain, appetite loss, blood in stools. · Genitourinary: No dysuria, trouble voiding, or hematuria. · Musculoskeletal:  No gait disturbance, No weakness or joint complaints. · Integumentary: No rash or pruritis. · Neurological: No headache or diplopia. No tingling  · Psychiatric: No anxiety, or depression. · Endocrine: No temperature intolerance. · Hematologic/Lymphatic: No abnormal bruising or bleeding, blood clots or swollen lymph nodes. · Allergic/Immunologic: No nasal congestion or hives. PHYSICAL EXAM:      Vitals:    10/20/21 1013   BP: (!) 151/71   Pulse: 79       Constitutional and General Appearance: alert, cooperative, no distress and appears stated age  HEENT: PERRL, no cervical lymphadenopathy. No masses palpable. Normal oral mucosa  Respiratory:  · Normal excursion and expansion without use of accessory muscles  · Resp Auscultation: Good respiratory effort. No for increased work of breathing. On auscultation: clear to auscultation bilaterally  Cardiovascular:  · Iregular S1 and S2.  · Jugular venous pulsation Normal  · The carotid upstroke is normal in amplitude and contour without delay or bruit   Abdomen:   · soft  · Bowel sounds present  Extremities:  ·  No edema  Neurological:  · Alert and oriented.     Cardiac Data:  EKG: Atrial fibrillation, low voltage    Labs:     CBC: No results for input(s): WBC, HGB, HCT, PLT in the last 72 hours. BMP: No results for input(s): NA, K, CO2, BUN, CREATININE, LABGLOM, GLUCOSE in the last 72 hours. PT/INR: No results for input(s): PROTIME, INR in the last 72 hours. FASTING LIPID PANEL:No results found for: HDL, LDLDIRECT, LDLCALC, TRIG  LIVER PROFILE:No results for input(s): AST, ALT, LABALBU in the last 72 hours. Problem List:  Patient Active Problem List   Diagnosis    Background diabetic retinopathy ([address]    Cataract    Myopia of both eyes with astigmatism and presbyopia    Dizziness    Vertigo    Balance problem    Peripheral polyneuropathy    Diabetes mellitus type II, controlled ([address]    Atrial fibrillation ([address]    Current use of long term anticoagulation    Cerebral ischemia    Hyperlipidemia    GERD (gastroesophageal reflux disease)    Essential hypertension    Arthritis of knee    Carotid stenosis, asymptomatic, right    VBI (vertebrobasilar insufficiency)    S/P angioplasty with stent      TTE 4/8/21  Summary  Normal left ventricular diameter. Borderline left ventricular hypertrophy. Left ventricular systolic function is normal. Left ventricular ejection  fraction 65 %. Evidence of diastolic dysfunction. Left atrium is mildly dilated. Right atrial dilatation. Right ventricular dilatation with normal systolic function. Aortic valve sclerosis without stenosis. Mild aortic insufficiency. Mitral annular calcification. Mitral Valve Prolapse of Anterior MV leaflet. Mild to moderate mitral regurgitation. Mild tricuspid regurgitation. Estimated right ventricular systolic pressure  is 35 mmHg. Cardiac cath 4/20/21   One vessel coronary artery disease. Successful bifurcation PCI of mid LAD and D1 using VERONICA. Recommendations      Medical therapy as needed. Risk factor modification. Routine Post PCI Orders.                Assessment and plan:     -H/o permanent Afib for 25 years- on coumadin. On coreg and digoxin. Coumadin managed by PCP. -Dyspnea with abnormal stress test 4/2021  -Chronic diastolic CHF- Continue lasix. Compression stocking- off while sleeping. -CAD bifurcation LAD/D1 disease. Successful PTCA/VERONICA of LAD and D1 on 4/20/21. Continue plavix. Continue statin. LDL 78 on 2/8/21. -HTN-Stable. Continue coreg. Monitor BP at home.  -Obesity - encouraged diet, exercise, and discussed weight loss extensively. -DM-Management per PCP  -GERD- on famotidine.  -Vertigo-dizziness with any movement and eye movement. managed by neurology  -Labyrinthitis- 6/2020  -RTC 6 months.     Farhana [facility]  [phone]

## 2021-10-25 ENCOUNTER — TELEPHONE (OUTPATIENT)
Dept: SURGERY | Age: 76
End: 2021-10-25

## 2021-10-25 NOTE — TELEPHONE ENCOUNTER
General Surgery referral received from [facility] office for anemia and positive occult stool test. Writer left message on patient's voicemail with clinic phone number to schedule appointment.

## 2021-10-28 NOTE — TELEPHONE ENCOUNTER
Writer left message on patient's voicemail with clinic phone number to schedule appointment. Faxed referral back to Dr. Windy King office ([phone]) informing them we have been unsuccessful in contacting the patient to schedule an appointment x3. Referral scanned to media.

## 2021-11-22 ENCOUNTER — TELEPHONE (OUTPATIENT)
Dept: SURGERY | Age: 76
End: 2021-11-22

## 2021-11-22 ENCOUNTER — INITIAL CONSULT (OUTPATIENT)
Dept: SURGERY | Age: 76
End: 2021-11-22
Payer: MEDICARE

## 2021-11-22 VITALS
BODY MASS INDEX: 34.97 KG/M2 | HEIGHT: 67 IN | WEIGHT: 222.8 LBS | TEMPERATURE: 97.8 F | DIASTOLIC BLOOD PRESSURE: 78 MMHG | SYSTOLIC BLOOD PRESSURE: 136 MMHG | HEART RATE: 78 BPM

## 2021-11-22 DIAGNOSIS — D64.9 ANEMIA, UNSPECIFIED TYPE: ICD-10-CM

## 2021-11-22 DIAGNOSIS — Z01.818 PRE-OP TESTING: Primary | ICD-10-CM

## 2021-11-22 DIAGNOSIS — Z86.16 HISTORY OF COVID-19: ICD-10-CM

## 2021-11-22 DIAGNOSIS — R19.5 POSITIVE FIT (FECAL IMMUNOCHEMICAL TEST): ICD-10-CM

## 2021-11-22 PROCEDURE — 1090F PRES/ABSN URINE INCON ASSESS: CPT | Performed by: SURGERY

## 2021-11-22 PROCEDURE — G8417 CALC BMI ABV UP PARAM F/U: HCPCS | Performed by: SURGERY

## 2021-11-22 PROCEDURE — 1036F TOBACCO NON-USER: CPT | Performed by: SURGERY

## 2021-11-22 PROCEDURE — G8427 DOCREV CUR MEDS BY ELIG CLIN: HCPCS | Performed by: SURGERY

## 2021-11-22 PROCEDURE — 99203 OFFICE O/P NEW LOW 30 MIN: CPT | Performed by: SURGERY

## 2021-11-22 PROCEDURE — 1123F ACP DISCUSS/DSCN MKR DOCD: CPT | Performed by: SURGERY

## 2021-11-22 PROCEDURE — 4040F PNEUMOC VAC/ADMIN/RCVD: CPT | Performed by: SURGERY

## 2021-11-22 PROCEDURE — G8482 FLU IMMUNIZE ORDER/ADMIN: HCPCS | Performed by: SURGERY

## 2021-11-22 PROCEDURE — G8400 PT W/DXA NO RESULTS DOC: HCPCS | Performed by: SURGERY

## 2021-11-22 RX ORDER — POLYETHYLENE GLYCOL 3350, SODIUM SULFATE ANHYDROUS, SODIUM BICARBONATE, SODIUM CHLORIDE, POTASSIUM CHLORIDE 236; 22.74; 6.74; 5.86; 2.97 G/4L; G/4L; G/4L; G/4L; G/4L
4 POWDER, FOR SOLUTION ORAL ONCE
Qty: 4000 ML | Refills: 0 | Status: SHIPPED | OUTPATIENT
Start: 2021-12-05 | End: 2021-12-05

## 2021-11-22 RX ORDER — BISACODYL 5 MG
10 TABLET, DELAYED RELEASE (ENTERIC COATED) ORAL ONCE
Qty: 2 TABLET | Refills: 0 | Status: SHIPPED | OUTPATIENT
Start: 2021-12-05 | End: 2021-12-05

## 2021-11-22 RX ORDER — ALBUTEROL SULFATE 2.5 MG/3ML
2.5 SOLUTION RESPIRATORY (INHALATION) EVERY 6 HOURS PRN
COMMUNITY
Start: 2021-11-08 | End: 2022-04-20

## 2021-11-22 NOTE — TELEPHONE ENCOUNTER
[facility]         Patient:Nikole Goldsmith           PRA:[id number]           Surgical/Procedure Planned: Colonoscopy and EGD    Date & Location: 12/6/21 [facility]       Outpatient   Planned Length of OR: 60 min    Sedation: mac    Estimated Cardiac Risk for Non-Cardiac Surgery/Procedure     Low__x____ Moderate______ High______    Medication Instructions - Clarification needed by this date:     Plavix   Hold _7__ Days  Coumadin  Hold __5_ Days    Resume medications:     Lovenox indicated: __x___Yes _____NO. Primary MD to bridge with lovenox prior to procedure.     Provider: Dr. Stanley Castillo, Dr. Partha Gray of Provider Giving Orders for Medication holds:    Roxanne Alberts MD    _____________________________________________

## 2021-11-22 NOTE — PROGRESS NOTES
Name:  Sissy Almaguer  Age:  64 y.o.   :  1945    Physician: Maverick Faustin MD       Chief Complaint: Anemia, + FIT test.      HPI:  Patient complains of fatigue, but that is likely due to her recovering from COVID 2 weeks. She is on H2 blocker for GERD. No history of ulcers, gastritis, colitis, polyps, tumors or diverticular disease. She is anemic, but iron levels are normal.  FIT test was positive. MEDICAL HISTORY:    Past Medical History:        Diagnosis Date    Dizzy     Hypertension     Type II or unspecified type diabetes mellitus without mention of complication, not stated as uncontrolled        Past Surgical History:        Procedure Laterality Date    CARDIAC CATHETERIZATION  2021    COLONOSCOPY      HYSTERECTOMY      JOINT REPLACEMENT      hip x2 knee x2       Prior to Admission medications    Medication Sig Start Date End Date Taking?  Authorizing Provider   albuterol (PROVENTIL) (2.5 MG/3ML) 0.083% nebulizer solution Inhale 2.5 mg into the lungs every 6 hours as needed for Wheezing 21  Yes Historical Provider, MD   citalopram (CELEXA) 10 MG tablet Take 10 mg by mouth daily 21 Yes Historical Provider, MD   atorvastatin (LIPITOR) 40 MG tablet Take 1 tablet by mouth nightly 21  Yes Alexey Bryant DO   clopidogrel (PLAVIX) 75 MG tablet Take 1 tablet by mouth daily 21  Yes Alexey Bryant DO   warfarin (COUMADIN) 2 MG tablet Take 2 mg by mouth As directed   Yes Historical Provider, MD   glipiZIDE (GLUCOTROL XL) 5 MG extended release tablet  11/3/19  Yes Historical Provider, MD   carvedilol (COREG) 6.25 MG tablet Take by mouth 2 times daily  3/18/16  Yes Historical Provider, MD   famotidine (PEPCID) 20 MG tablet Take 20 mg by mouth daily  3/18/16  Yes Historical Provider, MD   furosemide (LASIX) 20 MG tablet Take 20 mg by mouth daily  16  Yes Historical Provider, MD   metFORMIN (GLUCOPHAGE) 1000 MG tablet Take 1,000 mg by mouth 2 times daily (with meals)  3/21/16  Yes Historical Provider, MD   warfarin (COUMADIN) 2.5 MG tablet Take 2.5 mg by mouth daily \"5 days a week\" 3/2/16  Yes Historical Provider, MD       No Known Allergies     reports that she has never smoked. She has never used smokeless tobacco.  reports current alcohol use. Family History   Problem Relation Age of Onset    Other Mother         detached retina ou    Glaucoma Neg Hx     Cataracts Neg Hx     Diabetes Neg Hx             REVIEW OF SYSTEMS:  General:  Fatigue and fever. COVID 2 weeks ago. Fever has resolved, and overall feeling much better. Eye:  negative   ENT:negative  Allergy/Immunology:  negative  Hematology/Lymphatic: negative  Lungs: Cough and some shortness of breath - improving  Cardiovascular: negative  Gastrointestinal: negative  : negative  Neurological: negative      PHYSICAL EXAM:    /78 (Site: Right Upper Arm, Position: Sitting, Cuff Size: Large Adult)   Pulse 78   Temp 97.8 °F (36.6 °C) (Temporal)   Ht 5' 7\" (1.702 m)   Wt 222 lb 12.8 oz (101.1 kg)   BMI 34.90 kg/m²       Gen: Alert and oriented x3, no acute distress, well-appearing, obese    Eyes: PERRL, Sclera Anicteric    Head: Normocephalic, non tender     Neck: Supple, no significant adenopathy. No carotid bruits, thyroid normal size and no masses    Chest: CTA, no wheezes, no rales, no rhonchi, symmetrical    Heart: Normal rate, regular rhythm, no murmurs    Abdomen: Soft, positive bowel sounds, non tender, non distended, no masses, no hernias, no HSM, no bruits. Well healed low transverse scar. Neuro: Normal speech, motor/sensory grossly normal bilateral    MSK: No joint tenderness, deformity, or swelling           ASSESSMENT:  1) Anemia, + FIT test    PLAN:  1) Colonoscopy  -Want to make sure is reasonable recovered from Ara. Check chest xray. Risks and benefits of colonoscopy were discussed with Honorio Randhawa.   In particular I discussed the possibility of incomplete colonoscopy and failure to make a diagnosis. I also discussed the risks and consequences of reactions to the sedatives, bleeding and perforation. Alternate ways of evaluating the colon including barium enema, CT colonography and sigmoidoscopy were discussed. she was also given the opportunity to have any questions answered, and encouraged to call the office with additional issues.      Electronically signed by Lawanda Crowell MD on 11/22/2021 at 10:50 AM

## 2021-12-01 ENCOUNTER — TELEPHONE (OUTPATIENT)
Dept: SURGERY | Age: 76
End: 2021-12-01

## 2022-04-20 ENCOUNTER — OFFICE VISIT (OUTPATIENT)
Dept: CARDIOLOGY | Age: 77
End: 2022-04-20
Payer: MEDICARE

## 2022-04-20 VITALS
HEART RATE: 66 BPM | DIASTOLIC BLOOD PRESSURE: 86 MMHG | SYSTOLIC BLOOD PRESSURE: 132 MMHG | HEIGHT: 67 IN | BODY MASS INDEX: 34.81 KG/M2 | WEIGHT: 221.8 LBS

## 2022-04-20 DIAGNOSIS — I25.10 CORONARY ARTERY DISEASE INVOLVING NATIVE CORONARY ARTERY OF NATIVE HEART WITHOUT ANGINA PECTORIS: ICD-10-CM

## 2022-04-20 DIAGNOSIS — I48.21 PERMANENT ATRIAL FIBRILLATION (HCC): Primary | ICD-10-CM

## 2022-04-20 DIAGNOSIS — I10 ESSENTIAL HYPERTENSION: ICD-10-CM

## 2022-04-20 PROCEDURE — 1036F TOBACCO NON-USER: CPT | Performed by: INTERNAL MEDICINE

## 2022-04-20 PROCEDURE — 1123F ACP DISCUSS/DSCN MKR DOCD: CPT | Performed by: INTERNAL MEDICINE

## 2022-04-20 PROCEDURE — 99212 OFFICE O/P EST SF 10 MIN: CPT | Performed by: INTERNAL MEDICINE

## 2022-04-20 PROCEDURE — G8417 CALC BMI ABV UP PARAM F/U: HCPCS | Performed by: INTERNAL MEDICINE

## 2022-04-20 PROCEDURE — 93010 ELECTROCARDIOGRAM REPORT: CPT | Performed by: INTERNAL MEDICINE

## 2022-04-20 PROCEDURE — 1090F PRES/ABSN URINE INCON ASSESS: CPT | Performed by: INTERNAL MEDICINE

## 2022-04-20 PROCEDURE — 99214 OFFICE O/P EST MOD 30 MIN: CPT | Performed by: INTERNAL MEDICINE

## 2022-04-20 PROCEDURE — 4040F PNEUMOC VAC/ADMIN/RCVD: CPT | Performed by: INTERNAL MEDICINE

## 2022-04-20 PROCEDURE — G8427 DOCREV CUR MEDS BY ELIG CLIN: HCPCS | Performed by: INTERNAL MEDICINE

## 2022-04-20 PROCEDURE — 93005 ELECTROCARDIOGRAM TRACING: CPT | Performed by: INTERNAL MEDICINE

## 2022-04-20 PROCEDURE — G8400 PT W/DXA NO RESULTS DOC: HCPCS | Performed by: INTERNAL MEDICINE

## 2022-04-20 NOTE — PROGRESS NOTES
Today's Date: 4/20/2022  Patient's Name: Familia Barkley  Patient's age: 76 y.o., 1945    Subjective:  Familia Barkley is being seen in clinic today regarding   Chief Complaint   Patient presents with    6 Month Follow-Up    Atrial Fibrillation     HTN and CAD      she is here for follow up. She recently retained volume and PCP increase lasix and has lost 13 lbs. She reports dyspnea on exertion. She denies any chest pain. No syncope or pre-syncope, no orthopnea. Past Medical History:   has a past medical history of Coronary artery disease involving native coronary artery, Dizzy, Gastroesophageal reflux disease without esophagitis, Hypertension, Other hyperlipidemia, Permanent atrial fibrillation ([facility] [address] and Type II or unspecified type diabetes mellitus without mention of complication, not stated as uncontrolled. Past Surgical History:   has a past surgical history that includes Hysterectomy; Cardiac catheterization (04/20/2021); joint replacement; and Colonoscopy. Home Medications:  Prior to Admission medications    Medication Sig Start Date End Date Taking?  Authorizing Provider   albuterol (PROVENTIL) (2.5 MG/3ML) 0.083% nebulizer solution Inhale 2.5 mg into the lungs every 6 hours as needed for Wheezing 11/8/21   Historical Provider, MD   citalopram (CELEXA) 10 MG tablet Take 10 mg by mouth daily 8/16/21 2/12/22  Historical Provider, MD   atorvastatin (LIPITOR) 40 MG tablet Take 1 tablet by mouth nightly 4/27/21   Alexey Hartmanno, DO   clopidogrel (PLAVIX) 75 MG tablet Take 1 tablet by mouth daily 4/27/21   Alexey Bryant, DO   warfarin (COUMADIN) 2 MG tablet Take 2 mg by mouth As directed    Historical Provider, MD   glipiZIDE (GLUCOTROL XL) 5 MG extended release tablet  11/3/19   Historical Provider, MD   carvedilol (COREG) 6.25 MG tablet Take by mouth 2 times daily  3/18/16   Historical Provider, MD   famotidine (PEPCID) 20 MG tablet Take 20 mg by mouth daily  3/18/16   Historical Provider, MD furosemide (LASIX) 20 MG tablet Take 20 mg by mouth daily  4/6/16   Historical Provider, MD   metFORMIN (GLUCOPHAGE) 1000 MG tablet Take 1,000 mg by mouth 2 times daily (with meals)  3/21/16   Historical Provider, MD   warfarin (COUMADIN) 2.5 MG tablet Take 2.5 mg by mouth daily \"5 days a week\" 3/2/16   Historical Provider, MD       Allergies:  Patient has no known allergies. Social History:   reports that she has never smoked. She has never used smokeless tobacco. She reports current alcohol use. She reports that she does not use drugs. Family History: family history includes Other in her mother. No h/o sudden cardiac death. No for premature CAD    REVIEW OF SYSTEMS:    · Constitutional: there has been no unanticipated weight loss. There's been No change in energy level, No change in activity level. · Eyes: No visual changes or diplopia. No scleral icterus. · ENT: No Headaches, hearing loss or vertigo. No mouth sores or sore throat. · Cardiovascular: see above  · Respiratory: see above  · Gastrointestinal: No abdominal pain, appetite loss, blood in stools. · Genitourinary: No dysuria, trouble voiding, or hematuria. · Musculoskeletal:  No gait disturbance, No weakness or joint complaints. · Integumentary: No rash or pruritis. · Neurological: No headache or diplopia. No tingling  · Psychiatric: No anxiety, or depression. · Endocrine: No temperature intolerance. · Hematologic/Lymphatic: No abnormal bruising or bleeding, blood clots or swollen lymph nodes. · Allergic/Immunologic: No nasal congestion or hives. PHYSICAL EXAM:      Vitals:    04/20/22 1022   BP: 132/86   Pulse: 66       Constitutional and General Appearance: alert, cooperative, no distress and appears stated age  HEENT: PERRL, no cervical lymphadenopathy. No masses palpable. Normal oral mucosa  Respiratory:  · Normal excursion and expansion without use of accessory muscles  · Resp Auscultation: Good respiratory effort.  No for increased work of breathing. On auscultation: clear to auscultation bilaterally  Cardiovascular:  · irregular S1 and S2.  · Jugular venous pulsation Normal  · The carotid upstroke is normal in amplitude and contour without delay or bruit   Abdomen:   · soft  · Bowel sounds present  Extremities:  ·  trace edema  Neurological:  · Alert and oriented. Cardiac Data:  EKG: Afib,  anterior infarction    Labs:     CBC: No results for input(s): WBC, HGB, HCT, PLT in the last 72 hours. BMP: No results for input(s): NA, K, CO2, BUN, CREATININE, LABGLOM, GLUCOSE in the last 72 hours. PT/INR: No results for input(s): PROTIME, INR in the last 72 hours. FASTING LIPID PANEL:No results found for: HDL, LDLDIRECT, LDLCALC, TRIG  LIVER PROFILE:No results for input(s): AST, ALT, LABALBU in the last 72 hours. Problem List:  Patient Active Problem List   Diagnosis    Background diabetic retinopathy ([address]    Cataract    Myopia of both eyes with astigmatism and presbyopia    Dizziness    Vertigo    Balance problem    Peripheral polyneuropathy    Diabetes mellitus type II, controlled ([address]    Atrial fibrillation ([address]    Current use of long term anticoagulation    Cerebral ischemia    Hyperlipidemia    GERD (gastroesophageal reflux disease)    Essential hypertension    Arthritis of knee    Carotid stenosis, asymptomatic, right    VBI (vertebrobasilar insufficiency)    S/P angioplasty with stent      TTE 4/8/21  Summary  Normal left ventricular diameter. Borderline left ventricular hypertrophy. Left ventricular systolic function is normal. Left ventricular ejection  fraction 65 %. Evidence of diastolic dysfunction. Left atrium is mildly dilated. Right atrial dilatation. Right ventricular dilatation with normal systolic function. Aortic valve sclerosis without stenosis. Mild aortic insufficiency. Mitral annular calcification. Mitral Valve Prolapse of Anterior MV leaflet.   Mild to moderate mitral regurgitation. Mild tricuspid regurgitation. Estimated right ventricular systolic pressure  is 35 mmHg.     Cardiac cath 4/20/21   One vessel coronary artery disease.   Successful bifurcation PCI of mid LAD and D1 using VERONICA.     Recommendations      Medical therapy as needed.   Risk factor modification.   Routine Post PCI Orders.     TTE 2/18/22-LVEF 70%. 1+LVH, severely dilated LA, mildly dilated RA, mild to moderate MR/TR, RVSP 38 mm Hg.     Assessment and plan:     -H/o permanent Afib for 25 years- on coumadin. On coreg and digoxin. Coumadin managed by PCP. -Dyspnea- Chronic and stable. TTE 2/18/22-LVEF 70%. 1+LVH, severely dilated LA, mildly dilated RA, mild to moderate MR/TR, RVSP 38 mm Hg.  -Chronic diastolic CHF- Continue lasix. Recommend Jardiance 10mg po qday. I will have PCP order this given patient is being managed for DM. Compression stocking- off while sleeping. -CAD bifurcation LAD/D1 disease. Successful PTCA/VERONICA of LAD and D1 on 4/20/21. Continue plavix. Continue statin. LDL 78 on 2/8/21. -HTN-Stable. Continue coreg. Monitor BP at home.  -Obesity - encouraged diet, exercise, and discussed weight loss extensively. -DM-Management per PCP  -GERD- on famotidine.  -Vertigo-dizziness with any movement and eye movement. managed by neurology  -Labyrinthitis- 6/2020  -RTC 6 months.       Farhana Currie [id number]  [phone]

## 2022-05-18 RX ORDER — ATORVASTATIN CALCIUM 40 MG/1
TABLET, FILM COATED ORAL
Qty: 90 TABLET | Refills: 3 | Status: SHIPPED | OUTPATIENT
Start: 2022-05-18

## 2022-05-18 RX ORDER — CLOPIDOGREL BISULFATE 75 MG/1
TABLET ORAL
Qty: 90 TABLET | Refills: 3 | Status: SHIPPED | OUTPATIENT
Start: 2022-05-18

## 2022-11-02 ENCOUNTER — OFFICE VISIT (OUTPATIENT)
Dept: CARDIOLOGY | Age: 77
End: 2022-11-02
Payer: MEDICARE

## 2022-11-02 VITALS
DIASTOLIC BLOOD PRESSURE: 84 MMHG | OXYGEN SATURATION: 96 % | SYSTOLIC BLOOD PRESSURE: 128 MMHG | HEART RATE: 80 BPM | RESPIRATION RATE: 18 BRPM | BODY MASS INDEX: 32.24 KG/M2 | HEIGHT: 67 IN | WEIGHT: 205.38 LBS

## 2022-11-02 DIAGNOSIS — R42 DIZZINESS: ICD-10-CM

## 2022-11-02 DIAGNOSIS — I10 HYPERTENSION, ESSENTIAL: ICD-10-CM

## 2022-11-02 DIAGNOSIS — I48.21 PERMANENT ATRIAL FIBRILLATION (HCC): Primary | ICD-10-CM

## 2022-11-02 DIAGNOSIS — E78.2 MIXED HYPERLIPIDEMIA: ICD-10-CM

## 2022-11-02 DIAGNOSIS — I50.32 CHRONIC DIASTOLIC CHF (CONGESTIVE HEART FAILURE) (HCC): ICD-10-CM

## 2022-11-02 PROCEDURE — G8484 FLU IMMUNIZE NO ADMIN: HCPCS | Performed by: INTERNAL MEDICINE

## 2022-11-02 PROCEDURE — 99214 OFFICE O/P EST MOD 30 MIN: CPT | Performed by: INTERNAL MEDICINE

## 2022-11-02 PROCEDURE — 93010 ELECTROCARDIOGRAM REPORT: CPT | Performed by: INTERNAL MEDICINE

## 2022-11-02 PROCEDURE — 3074F SYST BP LT 130 MM HG: CPT | Performed by: INTERNAL MEDICINE

## 2022-11-02 PROCEDURE — 1123F ACP DISCUSS/DSCN MKR DOCD: CPT | Performed by: INTERNAL MEDICINE

## 2022-11-02 PROCEDURE — 1036F TOBACCO NON-USER: CPT | Performed by: INTERNAL MEDICINE

## 2022-11-02 PROCEDURE — 1090F PRES/ABSN URINE INCON ASSESS: CPT | Performed by: INTERNAL MEDICINE

## 2022-11-02 PROCEDURE — 3078F DIAST BP <80 MM HG: CPT | Performed by: INTERNAL MEDICINE

## 2022-11-02 PROCEDURE — G8417 CALC BMI ABV UP PARAM F/U: HCPCS | Performed by: INTERNAL MEDICINE

## 2022-11-02 PROCEDURE — 93005 ELECTROCARDIOGRAM TRACING: CPT | Performed by: INTERNAL MEDICINE

## 2022-11-02 PROCEDURE — 99212 OFFICE O/P EST SF 10 MIN: CPT | Performed by: INTERNAL MEDICINE

## 2022-11-02 PROCEDURE — G8400 PT W/DXA NO RESULTS DOC: HCPCS | Performed by: INTERNAL MEDICINE

## 2022-11-02 PROCEDURE — G8427 DOCREV CUR MEDS BY ELIG CLIN: HCPCS | Performed by: INTERNAL MEDICINE

## 2022-11-02 RX ORDER — EMPAGLIFLOZIN 10 MG/1
TABLET, FILM COATED ORAL
COMMUNITY
Start: 2022-10-11

## 2022-11-02 RX ORDER — TRAMADOL HYDROCHLORIDE 50 MG/1
TABLET ORAL
COMMUNITY
Start: 2022-09-14 | End: 2022-11-02 | Stop reason: ALTCHOICE

## 2022-11-02 RX ORDER — FUROSEMIDE 20 MG/1
20 TABLET ORAL DAILY
Qty: 90 TABLET | Refills: 3 | Status: SHIPPED | OUTPATIENT
Start: 2022-11-02

## 2022-11-02 RX ORDER — HYDROCODONE BITARTRATE AND ACETAMINOPHEN 5; 325 MG/1; MG/1
TABLET ORAL
COMMUNITY
Start: 2022-09-16 | End: 2022-11-02 | Stop reason: ALTCHOICE

## 2022-11-02 RX ORDER — CYANOCOBALAMIN 1000 UG/ML
INJECTION, SOLUTION INTRAMUSCULAR; SUBCUTANEOUS
COMMUNITY
Start: 2022-01-20 | End: 2022-11-02

## 2022-11-02 NOTE — PROGRESS NOTES
Today's Date: 11/2/2022  Patient's Name: Basil Real  Patient's age: 76 y.o., 1945    Subjective:  Basil Real is being seen in clinic today regarding HTN, afib and CAD- dizziness      she is here for follow up. She reports intermittent dizziness and describe sensation of spinning. No chest pain, no dyspnea, no PND, no syncope or pre-syncope, no orthopnea. Cr 0.6, K 3.7 on 10/31/22      Past Medical History:   has a past medical history of Coronary artery disease involving native coronary artery, Dizzy, Gastroesophageal reflux disease without esophagitis, Hypertension, Other hyperlipidemia, Permanent atrial fibrillation ([facility] [address] and Type II or unspecified type diabetes mellitus without mention of complication, not stated as uncontrolled. Past Surgical History:   has a past surgical history that includes Hysterectomy; Cardiac catheterization (04/20/2021); joint replacement; and Colonoscopy. Home Medications:  Prior to Admission medications    Medication Sig Start Date End Date Taking?  Authorizing Provider   clopidogrel (PLAVIX) 75 MG tablet TAKE 1 TABLET DAILY 5/18/22   Lidya Murcia MD   atorvastatin (LIPITOR) 40 MG tablet TAKE 1 TABLET NIGHTLY 5/18/22   Lidya Murcia MD   citalopram (CELEXA) 10 MG tablet Take 10 mg by mouth daily 8/16/21 4/20/22  Historical Provider, MD   warfarin (COUMADIN) 2 MG tablet Take 2 mg by mouth As directed    Historical Provider, MD   glipiZIDE (GLUCOTROL XL) 5 MG extended release tablet  11/3/19   Historical Provider, MD   carvedilol (COREG) 6.25 MG tablet Take by mouth 2 times daily  3/18/16   Historical Provider, MD   famotidine (PEPCID) 20 MG tablet Take 20 mg by mouth daily  3/18/16   Historical Provider, MD   furosemide (LASIX) 20 MG tablet Take 40 mg by mouth daily  4/6/16   Historical Provider, MD   metFORMIN (GLUCOPHAGE) 1000 MG tablet Take 1,000 mg by mouth 2 times daily (with meals)  3/21/16   Historical Provider, MD   warfarin (COUMADIN) 2.5 MG tablet Take 2.5 mg by mouth daily \"5 days a week\" 3/2/16   Historical Provider, MD       Allergies:  Patient has no known allergies. Social History:   reports that she has never smoked. She has never used smokeless tobacco. She reports current alcohol use. She reports that she does not use drugs. Family History: family history includes Other in her mother. No h/o sudden cardiac death. No for premature CAD    REVIEW OF SYSTEMS:    Constitutional: there has been no unanticipated weight loss. There's been No change in energy level, No change in activity level. Eyes: No visual changes or diplopia. No scleral icterus. ENT: No Headaches, hearing loss or vertigo. No mouth sores or sore throat. Cardiovascular: see above  Respiratory: see above  Gastrointestinal: No abdominal pain, appetite loss, blood in stools. Genitourinary: No dysuria, trouble voiding, or hematuria. Musculoskeletal:  No gait disturbance, No weakness or joint complaints. Integumentary: No rash or pruritis. Neurological: No headache or diplopia. No tingling  Psychiatric: No anxiety, or depression. Endocrine: No temperature intolerance. Hematologic/Lymphatic: No abnormal bruising or bleeding, blood clots or swollen lymph nodes. Allergic/Immunologic: No nasal congestion or hives. PHYSICAL EXAM:      Vitals:    11/02/22 0914   BP: 128/84   Pulse: 80   Resp: 18   SpO2: 96%       Constitutional and General Appearance: alert, cooperative, no distress and appears stated age  HEENT: PERRL, no cervical lymphadenopathy. No masses palpable. Normal oral mucosa  Respiratory:  Normal excursion and expansion without use of accessory muscles  Resp Auscultation: Good respiratory effort. No for increased work of breathing.  On auscultation: clear to auscultation bilaterally  Cardiovascular:  irregular S1 and S2.  Jugular venous pulsation Normal  The carotid upstroke is normal in amplitude and contour without delay or bruit   Abdomen:   soft  Bowel sounds present  Extremities:   No edema  Neurological:  Alert and oriented. Cardiac Data:  EKG: Atrial fibrillation    Labs:     CBC: No results for input(s): WBC, HGB, HCT, PLT in the last 72 hours. BMP: No results for input(s): NA, K, CO2, BUN, CREATININE, LABGLOM, GLUCOSE in the last 72 hours. PT/INR: No results for input(s): PROTIME, INR in the last 72 hours. FASTING LIPID PANEL:No results found for: HDL, LDLDIRECT, LDLCALC, TRIG  LIVER PROFILE:No results for input(s): AST, ALT, LABALBU in the last 72 hours. Problem List:  Patient Active Problem List   Diagnosis    Background diabetic retinopathy ([address]    Cataract    Myopia of both eyes with astigmatism and presbyopia    Dizziness    Vertigo    Balance problem    Peripheral polyneuropathy    Diabetes mellitus type II, controlled ([address]    Atrial fibrillation ([address]    Current use of long term anticoagulation    Cerebral ischemia    Hyperlipidemia    GERD (gastroesophageal reflux disease)    Essential hypertension    Arthritis of knee    Carotid stenosis, asymptomatic, right    VBI (vertebrobasilar insufficiency)    S/P angioplasty with stent        TTE 4/8/21  Summary  Normal left ventricular diameter. Borderline left ventricular hypertrophy. Left ventricular systolic function is normal. Left ventricular ejection  fraction 65 %. Evidence of diastolic dysfunction. Left atrium is mildly dilated. Right atrial dilatation. Right ventricular dilatation with normal systolic function. Aortic valve sclerosis without stenosis. Mild aortic insufficiency. Mitral annular calcification. Mitral Valve Prolapse of Anterior MV leaflet. Mild to moderate mitral regurgitation. Mild tricuspid regurgitation. Estimated right ventricular systolic pressure  is 35 mmHg. Cardiac cath 4/20/21   One vessel coronary artery disease. Successful bifurcation PCI of mid LAD and D1 using VERONICA. Recommendations      Medical therapy as needed. Risk factor modification. Routine Post PCI Orders. TTE 2/18/22-LVEF 70%. 1+LVH, severely dilated LA, mildly dilated RA, mild to moderate MR/TR, RVSP 38 mm Hg. Assessment and plan:     -Dizziness- lower lasix to 20mg po qday. Change coreg to metoprolol 25mg po BID. Compression sock- not to wear while sleeping.  -H/o permanent Afib for 25 years- on coumadin. On coreg Coumadin managed by PCP. -Dyspnea- Chronic and stable. TTE 2/18/22-LVEF 70%. 1+LVH, severely dilated LA, mildly dilated RA, mild to moderate MR/TR, RVSP 38 mm Hg.  -Chronic diastolic CHF- Continue lasix. Recommend Jardiance 10mg po qday. I will have PCP order this given patient is being managed for DM. Compression stocking- off while sleeping. -CAD bifurcation LAD/D1 disease. Successful PTCA/VERONICA of LAD and D1 on 4/20/21. Continue plavix. Continue statin. LDL 25 on 10/31/22. -HTN-Stable. Continue coreg. Monitor BP at home.  -Obesity - encouraged diet, exercise, and discussed weight loss extensively. -DM-Management per PCP  -GERD- on famotidine.  -Vertigo-dizziness with any movement and eye movement. managed by neurology  -Labyrinthitis- 6/2020  -RTC 6 months.     Farhana Hooker [id number]  [phone]

## 2022-12-08 ENCOUNTER — TELEPHONE (OUTPATIENT)
Dept: CARDIOLOGY | Age: 77
End: 2022-12-08

## 2022-12-08 NOTE — TELEPHONE ENCOUNTER
Pt ophthalmology office called to speak to Dr Jasvir Ariza about pt. Please call Dr Idalia Jennings @ [phone]. The provider did not give a reason to the caller.

## 2022-12-09 ENCOUNTER — TELEPHONE (OUTPATIENT)
Dept: CARDIOLOGY | Age: 77
End: 2022-12-09

## 2022-12-09 DIAGNOSIS — I65.29 STENOSIS OF CAROTID ARTERY, UNSPECIFIED LATERALITY: Primary | ICD-10-CM

## 2022-12-09 NOTE — TELEPHONE ENCOUNTER
Pt aware of Dr. Consuelo Mayroga recommendations. She is scheduled with Vascular on 12/22. I scheduled her with Michalene Goldmann on 12/14 at [employer]. Pt aware and agrees.

## 2022-12-09 NOTE — TELEPHONE ENCOUNTER
Still having dizziness. Last carotid US was 3/3/2021. Dr Parth Vance is wondering if she should have a repeat carotid US.  Advised her to go to ER for any worsening symptoms./

## 2022-12-14 ENCOUNTER — OFFICE VISIT (OUTPATIENT)
Dept: CARDIOLOGY | Age: 77
End: 2022-12-14
Payer: MEDICARE

## 2022-12-14 VITALS
WEIGHT: 207.4 LBS | HEART RATE: 70 BPM | BODY MASS INDEX: 32.48 KG/M2 | DIASTOLIC BLOOD PRESSURE: 70 MMHG | SYSTOLIC BLOOD PRESSURE: 128 MMHG

## 2022-12-14 DIAGNOSIS — I50.32 CHRONIC DIASTOLIC CHF (CONGESTIVE HEART FAILURE) (HCC): ICD-10-CM

## 2022-12-14 DIAGNOSIS — I48.21 PERMANENT ATRIAL FIBRILLATION (HCC): Primary | ICD-10-CM

## 2022-12-14 DIAGNOSIS — I65.29 STENOSIS OF CAROTID ARTERY, UNSPECIFIED LATERALITY: ICD-10-CM

## 2022-12-14 PROCEDURE — 99213 OFFICE O/P EST LOW 20 MIN: CPT | Performed by: NURSE PRACTITIONER

## 2022-12-14 PROCEDURE — 3074F SYST BP LT 130 MM HG: CPT | Performed by: NURSE PRACTITIONER

## 2022-12-14 PROCEDURE — 93010 ELECTROCARDIOGRAM REPORT: CPT | Performed by: NURSE PRACTITIONER

## 2022-12-14 PROCEDURE — 1036F TOBACCO NON-USER: CPT | Performed by: NURSE PRACTITIONER

## 2022-12-14 PROCEDURE — 99214 OFFICE O/P EST MOD 30 MIN: CPT | Performed by: NURSE PRACTITIONER

## 2022-12-14 PROCEDURE — G8400 PT W/DXA NO RESULTS DOC: HCPCS | Performed by: NURSE PRACTITIONER

## 2022-12-14 PROCEDURE — 1123F ACP DISCUSS/DSCN MKR DOCD: CPT | Performed by: NURSE PRACTITIONER

## 2022-12-14 PROCEDURE — 93005 ELECTROCARDIOGRAM TRACING: CPT | Performed by: NURSE PRACTITIONER

## 2022-12-14 PROCEDURE — G8417 CALC BMI ABV UP PARAM F/U: HCPCS | Performed by: NURSE PRACTITIONER

## 2022-12-14 PROCEDURE — G8484 FLU IMMUNIZE NO ADMIN: HCPCS | Performed by: NURSE PRACTITIONER

## 2022-12-14 PROCEDURE — 1090F PRES/ABSN URINE INCON ASSESS: CPT | Performed by: NURSE PRACTITIONER

## 2022-12-14 PROCEDURE — G8427 DOCREV CUR MEDS BY ELIG CLIN: HCPCS | Performed by: NURSE PRACTITIONER

## 2022-12-14 PROCEDURE — 3078F DIAST BP <80 MM HG: CPT | Performed by: NURSE PRACTITIONER

## 2022-12-14 NOTE — PROGRESS NOTES
Today's Date: 12/14/2022  Patient's Name: Vlad Good  Patient's age: 76 y.o., 1945    Subjective:  Vlad Good is being seen in clinic today regarding HTN, afib and CAD- dizziness      Pt seen and examined in the room. She states that she continues to have dizziness but may be slightly improved. She is scheduled to see vascular surgery next week. She takes all of her meds and states that if she doesn't take her lasix daily, she does swell up. She is reporting a lot of stress lately dealing with her 80year old  who has dementia. She does report a good support system. Past Medical History:   has a past medical history of Coronary artery disease involving native coronary artery, Dizzy, Gastroesophageal reflux disease without esophagitis, Hypertension, Other hyperlipidemia, Permanent atrial fibrillation ([facility] [address] and Type II or unspecified type diabetes mellitus without mention of complication, not stated as uncontrolled. Past Surgical History:   has a past surgical history that includes Hysterectomy; Cardiac catheterization (04/20/2021); joint replacement; and Colonoscopy. Home Medications:  Prior to Admission medications    Medication Sig Start Date End Date Taking?  Authorizing Provider   JARDIANCE 10 MG tablet  10/11/22   Historical Provider, MD   furosemide (LASIX) 20 MG tablet Take 1 tablet by mouth daily 11/2/22   Juliet Sue MD   metoprolol tartrate (LOPRESSOR) 25 MG tablet Take 1 tablet by mouth 2 times daily 11/2/22   Juliet Sue MD   clopidogrel (PLAVIX) 75 MG tablet TAKE 1 TABLET DAILY 5/18/22   Juliet Sue MD   atorvastatin (LIPITOR) 40 MG tablet TAKE 1 TABLET NIGHTLY 5/18/22   Juliet Sue MD   citalopram (CELEXA) 10 MG tablet Take 10 mg by mouth daily 8/16/21 11/2/22  Historical Provider, MD   warfarin (COUMADIN) 2 MG tablet Take 2 mg by mouth As directed    Historical Provider, MD   famotidine (PEPCID) 20 MG tablet Take 20 mg by mouth daily  3/18/16 bilaterally  Cardiovascular:  irregular S1 and S2.  Jugular venous pulsation Normal  The carotid upstroke is normal in amplitude and contour without delay or bruit   Abdomen:   soft  Bowel sounds present  Extremities:   No edema  Neurological:  Alert and oriented. Cardiac Data:  EKG: Atrial fibrillation    Labs:     CBC: No results for input(s): WBC, HGB, HCT, PLT in the last 72 hours. BMP: No results for input(s): NA, K, CO2, BUN, CREATININE, LABGLOM, GLUCOSE in the last 72 hours. PT/INR: No results for input(s): PROTIME, INR in the last 72 hours. FASTING LIPID PANEL:No results found for: HDL, LDLDIRECT, LDLCALC, TRIG  LIVER PROFILE:No results for input(s): AST, ALT, LABALBU in the last 72 hours. Problem List:  Patient Active Problem List   Diagnosis    Background diabetic retinopathy ([address]    Cataract    Myopia of both eyes with astigmatism and presbyopia    Dizziness    Vertigo    Balance problem    Peripheral polyneuropathy    Diabetes mellitus type II, controlled ([address]    Atrial fibrillation ([address]    Current use of long term anticoagulation    Cerebral ischemia    Hyperlipidemia    GERD (gastroesophageal reflux disease)    Essential hypertension    Arthritis of knee    Carotid stenosis, asymptomatic, right    VBI (vertebrobasilar insufficiency)    S/P angioplasty with stent        TTE 4/8/21  Summary  Normal left ventricular diameter. Borderline left ventricular hypertrophy. Left ventricular systolic function is normal. Left ventricular ejection  fraction 65 %. Evidence of diastolic dysfunction. Left atrium is mildly dilated. Right atrial dilatation. Right ventricular dilatation with normal systolic function. Aortic valve sclerosis without stenosis. Mild aortic insufficiency. Mitral annular calcification. Mitral Valve Prolapse of Anterior MV leaflet. Mild to moderate mitral regurgitation. Mild tricuspid regurgitation. Estimated right ventricular systolic pressure  is 35 mmHg.      Cardiac cath 4/20/21   One vessel coronary artery disease. Successful bifurcation PCI of mid LAD and D1 using VERONICA. Recommendations      Medical therapy as needed. Risk factor modification. Routine Post PCI Orders. TTE 2/18/22-LVEF 70%. 1+LVH, severely dilated LA, mildly dilated RA, mild to moderate MR/TR, RVSP 38 mm Hg. Assessment and plan:     -Dizziness- slightly improved. Continue lasix to 20mg po qday. Continue metoprolol 25mg po BID. Continue compression stockings which she is wearing today. Negative orthostatic bps today. - Carotid stenosis, refer to Vascular surgery. Has appt 12/22  -H/o permanent Afib for 25 years- on coumadin. On coreg Coumadin managed by PCP. -Dyspnea- Chronic and stable. TTE 2/18/22-LVEF 70%. 1+LVH, severely dilated LA, mildly dilated RA, mild to moderate MR/TR, RVSP 38 mm Hg.  -Chronic diastolic CHF- Continue lasix. Recommend Jardiance 10mg po qday. I will have PCP order this given patient is being managed for DM. Compression stocking- off while sleeping. -CAD bifurcation LAD/D1 disease. Successful PTCA/VERONICA of LAD and D1 on 4/20/21. Continue plavix. Continue statin. LDL 25 on 10/31/22. -HTN-Stable. Continue coreg. Monitor BP at home.  -Obesity - encouraged diet, exercise, and discussed weight loss extensively. -DM-Management per PCP  -GERD- on famotidine.  -Vertigo-dizziness with any movement and eye movement. managed by neurology. Hx of labyrinthitis. -RTC 3-6 months.     Annika Pod, APRN - [address] [place] [address] [facility]  [phone]

## 2023-03-03 ENCOUNTER — TELEPHONE (OUTPATIENT)
Dept: CARDIOLOGY | Age: 78
End: 2023-03-03

## 2023-03-03 NOTE — TELEPHONE ENCOUNTER
Pt had a bad fall on Monday and was taken to [facility]. Pt was taken off plavix and coumadin  due to injury around eye. Plavix is to be continued on 3/7 and the coumadin on 3/14.  Nagi wanted to let cardiologist know what is going on.          [phone]    Last Appt:  12/14/2022  Next Appt:   5/3/2023  Med verified in Epic

## 2023-03-06 ENCOUNTER — OFFICE VISIT (OUTPATIENT)
Dept: CARDIOLOGY | Age: 78
End: 2023-03-06
Payer: MEDICARE

## 2023-03-06 VITALS
SYSTOLIC BLOOD PRESSURE: 136 MMHG | BODY MASS INDEX: 32.23 KG/M2 | DIASTOLIC BLOOD PRESSURE: 84 MMHG | HEART RATE: 59 BPM | WEIGHT: 205.8 LBS

## 2023-03-06 DIAGNOSIS — I48.21 PERMANENT ATRIAL FIBRILLATION (HCC): Primary | ICD-10-CM

## 2023-03-06 DIAGNOSIS — I65.29 STENOSIS OF CAROTID ARTERY, UNSPECIFIED LATERALITY: ICD-10-CM

## 2023-03-06 DIAGNOSIS — I50.32 CHRONIC DIASTOLIC CHF (CONGESTIVE HEART FAILURE) (HCC): ICD-10-CM

## 2023-03-06 PROCEDURE — G8417 CALC BMI ABV UP PARAM F/U: HCPCS | Performed by: NURSE PRACTITIONER

## 2023-03-06 PROCEDURE — 3075F SYST BP GE 130 - 139MM HG: CPT | Performed by: NURSE PRACTITIONER

## 2023-03-06 PROCEDURE — G8484 FLU IMMUNIZE NO ADMIN: HCPCS | Performed by: NURSE PRACTITIONER

## 2023-03-06 PROCEDURE — 1090F PRES/ABSN URINE INCON ASSESS: CPT | Performed by: NURSE PRACTITIONER

## 2023-03-06 PROCEDURE — 1123F ACP DISCUSS/DSCN MKR DOCD: CPT | Performed by: NURSE PRACTITIONER

## 2023-03-06 PROCEDURE — 99214 OFFICE O/P EST MOD 30 MIN: CPT | Performed by: NURSE PRACTITIONER

## 2023-03-06 PROCEDURE — 3079F DIAST BP 80-89 MM HG: CPT | Performed by: NURSE PRACTITIONER

## 2023-03-06 PROCEDURE — G8400 PT W/DXA NO RESULTS DOC: HCPCS | Performed by: NURSE PRACTITIONER

## 2023-03-06 PROCEDURE — 93010 ELECTROCARDIOGRAM REPORT: CPT | Performed by: NURSE PRACTITIONER

## 2023-03-06 PROCEDURE — G8427 DOCREV CUR MEDS BY ELIG CLIN: HCPCS | Performed by: NURSE PRACTITIONER

## 2023-03-06 PROCEDURE — 93005 ELECTROCARDIOGRAM TRACING: CPT | Performed by: NURSE PRACTITIONER

## 2023-03-06 PROCEDURE — 1036F TOBACCO NON-USER: CPT | Performed by: NURSE PRACTITIONER

## 2023-03-06 RX ORDER — AMOXICILLIN AND CLAVULANATE POTASSIUM 875; 125 MG/1; MG/1
1 TABLET, FILM COATED ORAL EVERY 12 HOURS
COMMUNITY
Start: 2023-03-02 | End: 2023-03-09

## 2023-03-06 RX ORDER — AMOXICILLIN 500 MG/1
CAPSULE ORAL
COMMUNITY
Start: 2022-12-19

## 2023-03-06 RX ORDER — BRIMONIDINE TARTRATE AND TIMOLOL MALEATE 2; 5 MG/ML; MG/ML
1 SOLUTION OPHTHALMIC EVERY 12 HOURS SCHEDULED
COMMUNITY

## 2023-03-06 NOTE — PROGRESS NOTES
Today's Date: 3/6/2023  Patient's Name: Sissy Almaguer  Patient's age: [de-identified] y.o., 1945    Subjective:  Sissy Almaguer is being seen in clinic today regarding Afib and recent falls      Pt seen and examined in the room with daughter. Was recently visiting her  at the nursing home when she states she was walking down the [address] and tripped over the bottom of her foot. She states she is not longer having the dizziness she had been having. She is very adamant that she was not dizzy or lightheaded when she had this fall and \"totally tripped. \" She states her other fall was also mechanical in which she was getting OOB and went to close the door that was blowing in the wind and missed the handle. She is currently being followed by plastics d/t orbital fractures s/p fall. She also recovering from concussion and has SIGNIFICANT b/l periorbital swelling & ecchymosis (left > right). She is currently off her Coumadin & Plavix of which Plavix is to restart this week and Coumadin next week per PCP. Past Medical History:   has a past medical history of Coronary artery disease involving native coronary artery, Dizzy, Gastroesophageal reflux disease without esophagitis, Hypertension, Other hyperlipidemia, Permanent atrial fibrillation ([address] and Type II or unspecified type diabetes mellitus without mention of complication, not stated as uncontrolled. Past Surgical History:   has a past surgical history that includes Hysterectomy; Cardiac catheterization (04/20/2021); joint replacement; and Colonoscopy. Home Medications:  Prior to Admission medications    Medication Sig Start Date End Date Taking? Authorizing Provider   amoxicillin (AMOXIL) 500 MG capsule take 4 capsules by mouth 1 hour prior to dental appointment 12/19/22  Yes Historical Provider, MD   amoxicillin-clavulanate (AUGMENTIN) 875-125 MG per tablet Take 1 tablet by mouth in the morning and 1 tablet in the evening.  3/2/23 3/9/23 Yes Historical Provider, MD brimonidine-timolol (COMBIGAN) 0.2-0.5 % ophthalmic solution 1 drop every 12 hours   Yes Historical Provider, MD   diclofenac sodium (VOLTAREN) 1 % GEL As directed. Yes Historical Provider, MD   JARDIANCE 10 MG tablet  10/11/22  Yes Historical Provider, MD   furosemide (LASIX) 20 MG tablet Take 1 tablet by mouth daily 11/2/22  Yes Zenon Stuart MD   metoprolol tartrate (LOPRESSOR) 25 MG tablet Take 1 tablet by mouth 2 times daily 11/2/22  Yes Zenon Stuart MD   atorvastatin (LIPITOR) 40 MG tablet TAKE 1 TABLET NIGHTLY 5/18/22  Yes Zenon Stuart MD   famotidine (PEPCID) 20 MG tablet Take 20 mg by mouth daily  3/18/16  Yes Historical Provider, MD   clopidogrel (PLAVIX) 75 MG tablet TAKE 1 TABLET DAILY  Patient not taking: Reported on 3/6/2023 5/18/22   Zenon Stuart MD   citalopram (CELEXA) 10 MG tablet Take 10 mg by mouth daily 8/16/21 11/2/22  Historical Provider, MD   warfarin (COUMADIN) 2 MG tablet Take 2 mg by mouth As directed  Patient not taking: Reported on 3/6/2023    Historical Provider, MD   metFORMIN (GLUCOPHAGE) 1000 MG tablet Take 1,000 mg by mouth 2 times daily (with meals)   Patient not taking: Reported on 3/6/2023 3/21/16   Historical Provider, MD   warfarin (COUMADIN) 2.5 MG tablet Take 2.5 mg by mouth daily \"5 days a week\"  Patient not taking: Reported on 3/6/2023 3/2/16   Historical Provider, MD       Allergies:  Patient has no known allergies. Social History:   reports that she has never smoked. She has never used smokeless tobacco. She reports current alcohol use. She reports that she does not use drugs. Family History: family history includes Other in her mother. No h/o sudden cardiac death. No for premature CAD    REVIEW OF SYSTEMS:    Constitutional: there has been no unanticipated weight loss. There's been No change in energy level, No change in activity level. Eyes: No visual changes or diplopia. No scleral icterus. ENT: No Headaches, hearing loss or vertigo.  No mouth sores or sore throat. Cardiovascular: see above  Respiratory: see above  Gastrointestinal: No abdominal pain, appetite loss, blood in stools. Genitourinary: No dysuria, trouble voiding, or hematuria. Musculoskeletal:  No gait disturbance, No weakness or joint complaints. Integumentary: No rash or pruritis. Neurological: No headache or diplopia. No tingling  Psychiatric: No anxiety, or depression. Endocrine: No temperature intolerance. Hematologic/Lymphatic: No abnormal bruising or bleeding, blood clots or swollen lymph nodes. Allergic/Immunologic: No nasal congestion or hives. PHYSICAL EXAM:      Vitals:    03/06/23 1315   BP: 136/84         Constitutional and General Appearance: alert, cooperative, no distress and appears stated age  HEENT: PERRL, no cervical lymphadenopathy. Normal oral mucosa  Significant b/l periorbital swelling and ecchymosis as above. Hematoma noted or left eye with eye swollen shut. Respiratory:  Normal excursion and expansion without use of accessory muscles  Resp Auscultation: Good respiratory effort. No for increased work of breathing. On auscultation: clear to auscultation bilaterally  Cardiovascular:  irregular S1 and S2.  Jugular venous pulsation Normal  The carotid upstroke is normal in amplitude and contour without delay or bruit   Abdomen:   soft  Bowel sounds present  Extremities:   No edema  Neurological:  Alert and oriented. Cardiac Data:  EKG: Atrial fibrillation    Labs:     CBC: No results for input(s): WBC, HGB, HCT, PLT in the last 72 hours. BMP: No results for input(s): NA, K, CO2, BUN, CREATININE, LABGLOM, GLUCOSE in the last 72 hours. PT/INR: No results for input(s): PROTIME, INR in the last 72 hours. FASTING LIPID PANEL:No results found for: HDL, LDLDIRECT, LDLCALC, TRIG  LIVER PROFILE:No results for input(s): AST, ALT, LABALBU in the last 72 hours.     Problem List:  Patient Active Problem List   Diagnosis    Background diabetic retinopathy ([address]    Cataract    Myopia of both eyes with astigmatism and presbyopia    Dizziness    Vertigo    Balance problem    Peripheral polyneuropathy    Diabetes mellitus type II, controlled ([address]    Atrial fibrillation ([address]    Current use of long term anticoagulation    Cerebral ischemia    Hyperlipidemia    GERD (gastroesophageal reflux disease)    Essential hypertension    Arthritis of knee    Carotid stenosis, asymptomatic, right    VBI (vertebrobasilar insufficiency)    S/P angioplasty with stent        Ecg 3/6/23  Atrial fibrillation   -Poor R-wave progression -nonspecific -consider old anterior infarct. ABNORMAL RHYTHM      TTE 4/8/21  Summary  Normal left ventricular diameter. Borderline left ventricular hypertrophy. Left ventricular systolic function is normal. Left ventricular ejection  fraction 65 %. Evidence of diastolic dysfunction. Left atrium is mildly dilated. Right atrial dilatation. Right ventricular dilatation with normal systolic function. Aortic valve sclerosis without stenosis. Mild aortic insufficiency. Mitral annular calcification. Mitral Valve Prolapse of Anterior MV leaflet. Mild to moderate mitral regurgitation. Mild tricuspid regurgitation. Estimated right ventricular systolic pressure  is 35 mmHg. Cardiac cath 4/20/21   One vessel coronary artery disease. Successful bifurcation PCI of mid LAD and D1 using VERONICA. Recommendations      Medical therapy as needed. Risk factor modification. Routine Post PCI Orders. TTE 2/18/22-LVEF 70%. 1+LVH, severely dilated LA, mildly dilated RA, mild to moderate MR/TR, RVSP 38 mm Hg. Assessment and plan:     -H/o permanent Afib for 25 years- on coumadin with recent fall and significant hematoma, ecchymosis, & orbital swelling. EXTENSIVE conversation with patient and daughter regarding [facility] therapy with fall risk/bleeding. Questions/concerns addressed in detail.  Patient states these were mechanical falls and she is not longer dizzy or has any gait disturbances (prior to the fall and having vision impaired now). Watchman device reviewed in detail. Patient would like to discuss with family and state wait until she is feeling a little better before she makes any decision. She is off the Coumadin for another week and understands the risk vs benefit. Support provided. She will call office if/when she decides if she would like Watchman or would like to further discuss. Coumadin managed by PCP  -Dizziness- States improved and has not reoccurred in over a month. Continue lasix to 20mg po qday. Continue metoprolol 25mg po BID. Continue compression stockings which she is wearing today. - Carotid stenosis- stable and followed with vascular surgery where she was told it was \"stable and didn't need anything done. \"  -Dyspnea- Chronic and stable. TTE 2/18/22-LVEF 70%. 1+LVH, severely dilated LA, mildly dilated RA, mild to moderate MR/TR, RVSP 38 mm Hg.  -Chronic diastolic CHF- Continue lasix. Recommend Jardiance 10mg po qday. -CAD bifurcation LAD/D1 disease. Successful PTCA/VERONICA of LAD and D1 on 4/20/21. Continue plavix when able to resume. Continue statin. LDL 25 on 10/31/22. -HTN-Stable. Continue coreg. Monitor BP at home.  -Obesity - encouraged diet, exercise, and discussed weight loss extensively. -DM-Management per PCP  -GERD- on famotidine. Advised to f/u in clinc in 3 mo or sooner depending on decision and/or further questions on continuing Coumadin vs Watchman.  Verb understanding & satisfaction      Any Ascencio, [address] [facility]  [phone]

## 2023-03-15 ENCOUNTER — OFFICE VISIT (OUTPATIENT)
Dept: CARDIOLOGY | Age: 78
End: 2023-03-15
Payer: MEDICARE

## 2023-03-15 VITALS
HEART RATE: 68 BPM | HEIGHT: 67 IN | RESPIRATION RATE: 14 BRPM | WEIGHT: 197.4 LBS | DIASTOLIC BLOOD PRESSURE: 84 MMHG | BODY MASS INDEX: 30.98 KG/M2 | SYSTOLIC BLOOD PRESSURE: 136 MMHG

## 2023-03-15 DIAGNOSIS — I10 HYPERTENSION, ESSENTIAL: ICD-10-CM

## 2023-03-15 DIAGNOSIS — I25.10 CORONARY ARTERY DISEASE INVOLVING NATIVE CORONARY ARTERY OF NATIVE HEART WITHOUT ANGINA PECTORIS: ICD-10-CM

## 2023-03-15 DIAGNOSIS — I50.32 CHRONIC DIASTOLIC CHF (CONGESTIVE HEART FAILURE) (HCC): ICD-10-CM

## 2023-03-15 DIAGNOSIS — I48.21 PERMANENT ATRIAL FIBRILLATION (HCC): Primary | ICD-10-CM

## 2023-03-15 PROCEDURE — 99212 OFFICE O/P EST SF 10 MIN: CPT | Performed by: INTERNAL MEDICINE

## 2023-03-15 PROCEDURE — G8427 DOCREV CUR MEDS BY ELIG CLIN: HCPCS | Performed by: INTERNAL MEDICINE

## 2023-03-15 PROCEDURE — 3079F DIAST BP 80-89 MM HG: CPT | Performed by: INTERNAL MEDICINE

## 2023-03-15 PROCEDURE — G8417 CALC BMI ABV UP PARAM F/U: HCPCS | Performed by: INTERNAL MEDICINE

## 2023-03-15 PROCEDURE — G8400 PT W/DXA NO RESULTS DOC: HCPCS | Performed by: INTERNAL MEDICINE

## 2023-03-15 PROCEDURE — 1090F PRES/ABSN URINE INCON ASSESS: CPT | Performed by: INTERNAL MEDICINE

## 2023-03-15 PROCEDURE — 1123F ACP DISCUSS/DSCN MKR DOCD: CPT | Performed by: INTERNAL MEDICINE

## 2023-03-15 PROCEDURE — 99214 OFFICE O/P EST MOD 30 MIN: CPT | Performed by: INTERNAL MEDICINE

## 2023-03-15 PROCEDURE — G8484 FLU IMMUNIZE NO ADMIN: HCPCS | Performed by: INTERNAL MEDICINE

## 2023-03-15 PROCEDURE — 1036F TOBACCO NON-USER: CPT | Performed by: INTERNAL MEDICINE

## 2023-03-15 PROCEDURE — 3075F SYST BP GE 130 - 139MM HG: CPT | Performed by: INTERNAL MEDICINE

## 2023-03-15 NOTE — PROGRESS NOTES
Today's Date: 3/15/2023  Patient's Name: Dilcia Levy  Patient's age: [de-identified] y.o., 1945    Subjective:  Dilcia Levy is being seen in clinic today regarding Afib, CAD, HTN      she is here for follow up. She was seeing recently after fall. She had a mechanical fall. She had facial trauma and coumadin was stopped. She is back on plavix. She has permanent afib. She uses walker for ambulation. No chest pain, no dyspnea, no PND, no syncope or pre-syncope, no orthopnea. Reports that she had cold        Past Medical History:   has a past medical history of Coronary artery disease involving native coronary artery, Dizzy, Gastroesophageal reflux disease without esophagitis, Hypertension, Other hyperlipidemia, Permanent atrial fibrillation ([facility] [address] and Type II or unspecified type diabetes mellitus without mention of complication, not stated as uncontrolled. Past Surgical History:   has a past surgical history that includes Hysterectomy; Cardiac catheterization (04/20/2021); joint replacement; and Colonoscopy. Home Medications:  Prior to Admission medications    Medication Sig Start Date End Date Taking? Authorizing Provider   amoxicillin (AMOXIL) 500 MG capsule take 4 capsules by mouth 1 hour prior to dental appointment 12/19/22   Historical Provider, MD   brimonidine-timolol (COMBIGAN) 0.2-0.5 % ophthalmic solution 1 drop every 12 hours    Historical Provider, MD   diclofenac sodium (VOLTAREN) 1 % GEL As directed.     Historical Provider, MD   JARDIANCE 10 MG tablet  10/11/22   Historical Provider, MD   furosemide (LASIX) 20 MG tablet Take 1 tablet by mouth daily 11/2/22   Shonna Timmons MD   metoprolol tartrate (LOPRESSOR) 25 MG tablet Take 1 tablet by mouth 2 times daily 11/2/22   Shonna Timmons MD   clopidogrel (PLAVIX) 75 MG tablet TAKE 1 TABLET DAILY  Patient not taking: Reported on 3/6/2023 5/18/22   Shonna Timmons MD   atorvastatin (LIPITOR) 40 MG tablet TAKE 1 TABLET NIGHTLY 5/18/22   Shonna Timmons MD   citalopram (CELEXA) 10 MG tablet Take 10 mg by mouth daily 8/16/21 11/2/22  Historical Provider, MD   warfarin (COUMADIN) 2 MG tablet Take 2 mg by mouth As directed  Patient not taking: Reported on 3/6/2023    Historical Provider, MD   famotidine (PEPCID) 20 MG tablet Take 20 mg by mouth daily  3/18/16   Historical Provider, MD   metFORMIN (GLUCOPHAGE) 1000 MG tablet Take 1,000 mg by mouth 2 times daily (with meals)   Patient not taking: Reported on 3/6/2023 3/21/16   Historical Provider, MD   warfarin (COUMADIN) 2.5 MG tablet Take 2.5 mg by mouth daily \"5 days a week\"  Patient not taking: Reported on 3/6/2023 3/2/16   Historical Provider, MD       Allergies:  Patient has no known allergies. Social History:   reports that she has never smoked. She has never used smokeless tobacco. She reports current alcohol use. She reports that she does not use drugs. Family History: family history includes Other in her mother. No h/o sudden cardiac death. No for premature CAD    REVIEW OF SYSTEMS:    Constitutional: there has been no unanticipated weight loss. There's been No change in energy level, No change in activity level. Eyes: No visual changes or diplopia. No scleral icterus. ENT: No Headaches, hearing loss or vertigo. No mouth sores or sore throat. Cardiovascular: see above  Respiratory: see above  Gastrointestinal: No abdominal pain, appetite loss, blood in stools. Genitourinary: No dysuria, trouble voiding, or hematuria. Musculoskeletal:  No gait disturbance, No weakness or joint complaints. Integumentary: No rash or pruritis. Neurological: No headache or diplopia. No tingling  Psychiatric: No anxiety, or depression. Endocrine: No temperature intolerance. Hematologic/Lymphatic: No abnormal bruising or bleeding, blood clots or swollen lymph nodes. Allergic/Immunologic: No nasal congestion or hives.       PHYSICAL EXAM:      Vitals:    03/15/23 1100   BP: 136/84   Pulse: 68   Resp: 14       Constitutional and General Appearance: alert, cooperative, no distress and appears stated age  HEENT: PERRL, no cervical lymphadenopathy. No masses palpable. Normal oral mucosa  Respiratory:  Normal excursion and expansion without use of accessory muscles  Resp Auscultation: Good respiratory effort. No for increased work of breathing. On auscultation: right side wheezing  Cardiovascular:  irregular  Jugular venous pulsation Normal  The carotid upstroke is normal in amplitude and contour without delay or bruit   Abdomen:   soft  Bowel sounds present  Extremities:   No edema  Neurological:  Alert and oriented.    Cardiac Data:  EKG: Afib, slow rate. 3/6/23    Labs:     CBC: No results for input(s): WBC, HGB, HCT, PLT in the last 72 hours.  BMP: No results for input(s): NA, K, CO2, BUN, CREATININE, LABGLOM, GLUCOSE in the last 72 hours.  PT/INR: No results for input(s): PROTIME, INR in the last 72 hours.  FASTING LIPID PANEL:No results found for: HDL, LDLDIRECT, LDLCALC, TRIG  LIVER PROFILE:No results for input(s): AST, ALT, LABALBU in the last 72 hours.    Problem List:  Patient Active Problem List   Diagnosis    Background diabetic retinopathy (HCC)    Cataract    Myopia of both eyes with astigmatism and presbyopia    Dizziness    Vertigo    Balance problem    Peripheral polyneuropathy    Diabetes mellitus type II, controlled (HCC)    Atrial fibrillation (HCC)    Current use of long term anticoagulation    Cerebral ischemia    Hyperlipidemia    GERD (gastroesophageal reflux disease)    Essential hypertension    Arthritis of knee    Carotid stenosis, asymptomatic, right    VBI (vertebrobasilar insufficiency)    S/P angioplasty with stent      TTE 4/8/21  Summary  Normal left ventricular diameter.  Borderline left ventricular hypertrophy.  Left ventricular systolic function is normal. Left ventricular ejection  fraction 65 %.  Evidence of diastolic dysfunction.  Left atrium is mildly dilated. Right atrial  dilatation. Right ventricular dilatation with normal systolic function. Aortic valve sclerosis without stenosis. Mild aortic insufficiency. Mitral annular calcification. Mitral Valve Prolapse of Anterior MV leaflet. Mild to moderate mitral regurgitation. Mild tricuspid regurgitation. Estimated right ventricular systolic pressure  is 35 mmHg. Cardiac cath 4/20/21   One vessel coronary artery disease. Successful bifurcation PCI of mid LAD and D1 using VERONICA. Recommendations      Medical therapy as needed. Risk factor modification. Routine Post PCI Orders. TTE 2/18/22-LVEF 70%. 1+LVH, severely dilated LA, mildly dilated RA, mild to moderate MR/TR, RVSP 38 mm Hg. Assessment and plan:     -H/o permanent Afib for 27 years- on coumadin with recent fall and significant hematoma, ecchymosis, & orbital swelling. I discussed with patient and daughter watchman device, indications, complications and alternatives. Patient will think about it and will let us know.   -Dizziness- No recurrence reported. States improved and has not reoccurred in over a month. Continue lasix to 20mg po qday. Continue metoprolol 25mg po BID. Continue compression stockings which she is wearing today. - Carotid stenosis- stable and followed with vascular surgery where she was told it was \"stable and didn't need anything done. \"  -Dyspnea- Chronic and stable. TTE 2/18/22-LVEF 70%. 1+LVH, severely dilated LA, mildly dilated RA, mild to moderate MR/TR, RVSP 38 mm Hg.  -Chronic diastolic CHF- Continue lasix and Jardiance. Stable  -CAD bifurcation LAD/D1 disease. Successful PTCA/VERONICA of LAD and D1 on 4/20/21. Continue plavix. Continue statin. LDL 25 on 10/31/22. Stable  -HTN-Stable. Continue metoprolol. Monitor BP at home.  -Obesity - encouraged diet, exercise, and discussed weight loss extensively.   -DM-Management per PCP  -GERD- on famotidine.  -Recommended urgent care visit to evaluate lungs,/Cxray due to cold and wheezing.     Farhana Morales [facility]  [phone]

## 2023-05-08 RX ORDER — CLOPIDOGREL BISULFATE 75 MG/1
75 TABLET ORAL DAILY
Qty: 90 TABLET | Refills: 3 | Status: SHIPPED | OUTPATIENT
Start: 2023-05-08

## 2023-05-08 RX ORDER — ATORVASTATIN CALCIUM 40 MG/1
40 TABLET, FILM COATED ORAL NIGHTLY
Qty: 90 TABLET | Refills: 3 | Status: SHIPPED | OUTPATIENT
Start: 2023-05-08

## 2023-05-08 NOTE — TELEPHONE ENCOUNTER
Last Appt:  3/15/2023  Next Appt:   9/27/2023  Med verified in [address] [place] [address] Rd    Fax received from [address] [place] [address] requesting refills for atorvastatin 40 mg and plavix

## 2023-09-27 ENCOUNTER — OFFICE VISIT (OUTPATIENT)
Dept: CARDIOLOGY | Age: 78
End: 2023-09-27
Payer: MEDICARE

## 2023-09-27 VITALS
DIASTOLIC BLOOD PRESSURE: 66 MMHG | HEART RATE: 57 BPM | WEIGHT: 222 LBS | SYSTOLIC BLOOD PRESSURE: 112 MMHG | BODY MASS INDEX: 34.77 KG/M2

## 2023-09-27 DIAGNOSIS — E78.2 MIXED HYPERLIPIDEMIA: ICD-10-CM

## 2023-09-27 DIAGNOSIS — I10 HYPERTENSION, ESSENTIAL: ICD-10-CM

## 2023-09-27 DIAGNOSIS — I48.21 PERMANENT ATRIAL FIBRILLATION (HCC): ICD-10-CM

## 2023-09-27 DIAGNOSIS — I25.118 CORONARY ARTERY DISEASE INVOLVING NATIVE CORONARY ARTERY OF NATIVE HEART WITH OTHER FORM OF ANGINA PECTORIS (HCC): Primary | ICD-10-CM

## 2023-09-27 PROCEDURE — 99214 OFFICE O/P EST MOD 30 MIN: CPT | Performed by: INTERNAL MEDICINE

## 2023-09-27 PROCEDURE — 1036F TOBACCO NON-USER: CPT | Performed by: INTERNAL MEDICINE

## 2023-09-27 PROCEDURE — 93005 ELECTROCARDIOGRAM TRACING: CPT | Performed by: INTERNAL MEDICINE

## 2023-09-27 PROCEDURE — G8400 PT W/DXA NO RESULTS DOC: HCPCS | Performed by: INTERNAL MEDICINE

## 2023-09-27 PROCEDURE — 1123F ACP DISCUSS/DSCN MKR DOCD: CPT | Performed by: INTERNAL MEDICINE

## 2023-09-27 PROCEDURE — G8417 CALC BMI ABV UP PARAM F/U: HCPCS | Performed by: INTERNAL MEDICINE

## 2023-09-27 PROCEDURE — 3078F DIAST BP <80 MM HG: CPT | Performed by: INTERNAL MEDICINE

## 2023-09-27 PROCEDURE — G8427 DOCREV CUR MEDS BY ELIG CLIN: HCPCS | Performed by: INTERNAL MEDICINE

## 2023-09-27 PROCEDURE — 93010 ELECTROCARDIOGRAM REPORT: CPT | Performed by: INTERNAL MEDICINE

## 2023-09-27 PROCEDURE — 3074F SYST BP LT 130 MM HG: CPT | Performed by: INTERNAL MEDICINE

## 2023-09-27 PROCEDURE — 1090F PRES/ABSN URINE INCON ASSESS: CPT | Performed by: INTERNAL MEDICINE

## 2023-09-27 NOTE — PROGRESS NOTES
Today's Date: 9/27/2023  Patient's Name: Jonathan Shannon  Patient's age: 78 y.o., 1945    Subjective:  Jonathan Shannon is being seen in clinic today regarding   Chief Complaint   Patient presents with    Shortness of Breath     With exertion     Atrial Fibrillation     6 mo         she is here for follow up. She reports chest pain band like sensation walking if there is a little uphill grade to the ground. She report dyspnea on moderate exertion. No PND, no syncope or pre-syncope, no orthopnea. No falls recently- last one 6 months ago. No bleeding reported        Past Medical History:   has a past medical history of Coronary artery disease involving native coronary artery, Dizzy, Gastroesophageal reflux disease without esophagitis, Hypertension, Other hyperlipidemia, Permanent atrial fibrillation ([address]), and Type II or unspecified type diabetes mellitus without mention of complication, not stated as uncontrolled. Past Surgical History:   has a past surgical history that includes Hysterectomy; Cardiac catheterization (04/20/2021); joint replacement; and Colonoscopy. Home Medications:  Prior to Admission medications    Medication Sig Start Date End Date Taking? Authorizing Provider   atorvastatin (LIPITOR) 40 MG tablet Take 1 tablet by mouth nightly 5/8/23   Miguel Ángel Bryant,    clopidogrel (PLAVIX) 75 MG tablet Take 1 tablet by mouth daily 5/8/23   Miguel Ángel Bryant DO   metFORMIN (GLUCOPHAGE) 1000 MG tablet 1 tablet with a meal    Historical Provider, MD   brimonidine-timolol (COMBIGAN) 0.2-0.5 % ophthalmic solution 1 drop every 12 hours    Historical Provider, MD   diclofenac sodium (VOLTAREN) 1 % GEL As directed.     Historical Provider, MD   JARDIANCE 10 MG tablet  10/11/22   Historical Provider, MD   furosemide (LASIX) 20 MG tablet Take 1 tablet by mouth daily 11/2/22   Mat Putnam MD   metoprolol tartrate (LOPRESSOR) 25 MG tablet Take 1 tablet by mouth 2 times daily 11/2/22   Mat Putnam MD   citalopram

## 2023-10-02 ENCOUNTER — HOSPITAL ENCOUNTER (OUTPATIENT)
Age: 78
Discharge: HOME | End: 2023-10-04
Attending: INTERNAL MEDICINE
Payer: MEDICARE

## 2023-10-02 ENCOUNTER — HOSPITAL ENCOUNTER (OUTPATIENT)
Dept: NUCLEAR MEDICINE | Age: 78
Discharge: HOME | End: 2023-10-04
Attending: INTERNAL MEDICINE
Payer: MEDICARE

## 2023-10-02 ENCOUNTER — TELEPHONE (OUTPATIENT)
Dept: CARDIOLOGY | Age: 78
End: 2023-10-02

## 2023-10-02 DIAGNOSIS — I25.118 CORONARY ARTERY DISEASE INVOLVING NATIVE CORONARY ARTERY OF NATIVE HEART WITH OTHER FORM OF ANGINA PECTORIS (HCC): ICD-10-CM

## 2023-10-02 LAB
NUC STRESS EJECTION FRACTION: 69 %
STRESS BASELINE DIAS BP: 83 MMHG
STRESS BASELINE HR: 70 BPM
STRESS BASELINE SYS BP: 152 MMHG
STRESS ESTIMATED WORKLOAD: 1 METS
STRESS PEAK DIAS BP: 88 MMHG
STRESS PEAK SYS BP: 157 MMHG
STRESS PERCENT HR ACHIEVED: 64 %
STRESS POST PEAK HR: 90 BPM
STRESS RATE PRESSURE PRODUCT: NORMAL BPM*MMHG
STRESS TARGET HR: 140 BPM
TID: 1.26

## 2023-10-02 PROCEDURE — 78452 HT MUSCLE IMAGE SPECT MULT: CPT | Performed by: INTERNAL MEDICINE

## 2023-10-02 PROCEDURE — 93017 CV STRESS TEST TRACING ONLY: CPT

## 2023-10-02 PROCEDURE — 78452 HT MUSCLE IMAGE SPECT MULT: CPT

## 2023-10-02 PROCEDURE — 3430000000 HC RX DIAGNOSTIC RADIOPHARMACEUTICAL: Performed by: INTERNAL MEDICINE

## 2023-10-02 PROCEDURE — 6360000002 HC RX W HCPCS: Performed by: INTERNAL MEDICINE

## 2023-10-02 PROCEDURE — 93016 CV STRESS TEST SUPVJ ONLY: CPT | Performed by: INTERNAL MEDICINE

## 2023-10-02 PROCEDURE — A9500 TC99M SESTAMIBI: HCPCS | Performed by: INTERNAL MEDICINE

## 2023-10-02 PROCEDURE — 93018 CV STRESS TEST I&R ONLY: CPT | Performed by: INTERNAL MEDICINE

## 2023-10-02 RX ORDER — REGADENOSON 0.08 MG/ML
0.4 INJECTION, SOLUTION INTRAVENOUS ONCE
Status: COMPLETED | OUTPATIENT
Start: 2023-10-02 | End: 2023-10-02

## 2023-10-02 RX ORDER — TETRAKIS(2-METHOXYISOBUTYLISOCYANIDE)COPPER(I) TETRAFLUOROBORATE 1 MG/ML
10 INJECTION, POWDER, LYOPHILIZED, FOR SOLUTION INTRAVENOUS
Status: COMPLETED | OUTPATIENT
Start: 2023-10-02 | End: 2023-10-02

## 2023-10-02 RX ORDER — TETRAKIS(2-METHOXYISOBUTYLISOCYANIDE)COPPER(I) TETRAFLUOROBORATE 1 MG/ML
30 INJECTION, POWDER, LYOPHILIZED, FOR SOLUTION INTRAVENOUS
Status: COMPLETED | OUTPATIENT
Start: 2023-10-02 | End: 2023-10-02

## 2023-10-02 RX ADMIN — REGADENOSON 0.4 MG: 0.08 INJECTION, SOLUTION INTRAVENOUS at 10:56

## 2023-10-02 RX ADMIN — Medication 10 MILLICURIE: at 09:43

## 2023-10-02 RX ADMIN — Medication 30 MILLICURIE: at 10:57

## 2023-10-02 NOTE — TELEPHONE ENCOUNTER
Please review stress test results and advise.      Last Appt:  9/27/2023  Next Appt:   3/27/2024  Med verified in Epic

## 2023-10-05 ENCOUNTER — TELEPHONE (OUTPATIENT)
Dept: CARDIOLOGY | Age: 78
End: 2023-10-05

## 2023-10-05 NOTE — TELEPHONE ENCOUNTER
Pt called to ask if she is ok to do light exercise class with the senior center. She has been taking the class for 3 weeks already. Involves some walking, a lot of movement. Pt does have to stop at times due to fatigue.  Recent abnormal stress      Last Appt:  9/27/2023  Next Appt:   10/11/2023  Med verified in [employer]

## 2023-10-11 ENCOUNTER — HOSPITAL ENCOUNTER (OUTPATIENT)
Age: 78
Discharge: HOME | End: 2023-10-11
Payer: MEDICARE

## 2023-10-11 ENCOUNTER — OFFICE VISIT (OUTPATIENT)
Dept: CARDIOLOGY | Age: 78
End: 2023-10-11
Payer: MEDICARE

## 2023-10-11 VITALS
BODY MASS INDEX: 34.47 KG/M2 | DIASTOLIC BLOOD PRESSURE: 64 MMHG | RESPIRATION RATE: 16 BRPM | SYSTOLIC BLOOD PRESSURE: 118 MMHG | HEART RATE: 57 BPM | OXYGEN SATURATION: 98 % | HEIGHT: 67 IN | WEIGHT: 219.6 LBS

## 2023-10-11 DIAGNOSIS — I25.118 CORONARY ARTERY DISEASE INVOLVING NATIVE CORONARY ARTERY OF NATIVE HEART WITH OTHER FORM OF ANGINA PECTORIS (HCC): Primary | ICD-10-CM

## 2023-10-11 DIAGNOSIS — I65.29 STENOSIS OF CAROTID ARTERY, UNSPECIFIED LATERALITY: ICD-10-CM

## 2023-10-11 DIAGNOSIS — I50.32 CHRONIC DIASTOLIC CHF (CONGESTIVE HEART FAILURE) (HCC): ICD-10-CM

## 2023-10-11 DIAGNOSIS — I10 HYPERTENSION, ESSENTIAL: ICD-10-CM

## 2023-10-11 DIAGNOSIS — I25.118 CORONARY ARTERY DISEASE INVOLVING NATIVE CORONARY ARTERY OF NATIVE HEART WITH OTHER FORM OF ANGINA PECTORIS (HCC): ICD-10-CM

## 2023-10-11 DIAGNOSIS — R94.39 ABNORMAL STRESS TEST: ICD-10-CM

## 2023-10-11 DIAGNOSIS — I48.21 PERMANENT ATRIAL FIBRILLATION (HCC): ICD-10-CM

## 2023-10-11 LAB
ANION GAP SERPL CALCULATED.3IONS-SCNC: 9 MMOL/L (ref 9–17)
BUN SERPL-MCNC: 17 MG/DL (ref 8–23)
BUN/CREAT SERPL: 24 (ref 9–20)
CALCIUM SERPL-MCNC: 9.8 MG/DL (ref 8.6–10.4)
CHLORIDE SERPL-SCNC: 103 MMOL/L (ref 98–107)
CO2 SERPL-SCNC: 30 MMOL/L (ref 20–31)
CREAT SERPL-MCNC: 0.7 MG/DL (ref 0.5–0.9)
ERYTHROCYTE [DISTWIDTH] IN BLOOD BY AUTOMATED COUNT: 15.6 % (ref 11.8–14.4)
GFR SERPL CREATININE-BSD FRML MDRD: >60 ML/MIN/1.73M2
GLUCOSE SERPL-MCNC: 89 MG/DL (ref 70–99)
HCT VFR BLD AUTO: 43.5 % (ref 36.3–47.1)
HGB BLD-MCNC: 13.8 G/DL (ref 11.9–15.1)
MCH RBC QN AUTO: 29.7 PG (ref 25.2–33.5)
MCHC RBC AUTO-ENTMCNC: 31.7 G/DL (ref 25.2–33.5)
MCV RBC AUTO: 93.5 FL (ref 82.6–102.9)
NRBC BLD-RTO: 0 PER 100 WBC
PLATELET # BLD AUTO: 209 K/UL (ref 138–453)
PMV BLD AUTO: 9.9 FL (ref 8.1–13.5)
POTASSIUM SERPL-SCNC: 4.2 MMOL/L (ref 3.7–5.3)
RBC # BLD AUTO: 4.65 M/UL (ref 3.95–5.11)
SODIUM SERPL-SCNC: 142 MMOL/L (ref 135–144)
WBC OTHER # BLD: 6.5 K/UL (ref 3.5–11.3)

## 2023-10-11 PROCEDURE — 3078F DIAST BP <80 MM HG: CPT | Performed by: INTERNAL MEDICINE

## 2023-10-11 PROCEDURE — 80048 BASIC METABOLIC PNL TOTAL CA: CPT

## 2023-10-11 PROCEDURE — 1036F TOBACCO NON-USER: CPT | Performed by: INTERNAL MEDICINE

## 2023-10-11 PROCEDURE — 99214 OFFICE O/P EST MOD 30 MIN: CPT | Performed by: INTERNAL MEDICINE

## 2023-10-11 PROCEDURE — 36415 COLL VENOUS BLD VENIPUNCTURE: CPT

## 2023-10-11 PROCEDURE — 93005 ELECTROCARDIOGRAM TRACING: CPT | Performed by: INTERNAL MEDICINE

## 2023-10-11 PROCEDURE — G8484 FLU IMMUNIZE NO ADMIN: HCPCS | Performed by: INTERNAL MEDICINE

## 2023-10-11 PROCEDURE — G8417 CALC BMI ABV UP PARAM F/U: HCPCS | Performed by: INTERNAL MEDICINE

## 2023-10-11 PROCEDURE — 1090F PRES/ABSN URINE INCON ASSESS: CPT | Performed by: INTERNAL MEDICINE

## 2023-10-11 PROCEDURE — 3074F SYST BP LT 130 MM HG: CPT | Performed by: INTERNAL MEDICINE

## 2023-10-11 PROCEDURE — 85027 COMPLETE CBC AUTOMATED: CPT

## 2023-10-11 PROCEDURE — G8427 DOCREV CUR MEDS BY ELIG CLIN: HCPCS | Performed by: INTERNAL MEDICINE

## 2023-10-11 PROCEDURE — 1123F ACP DISCUSS/DSCN MKR DOCD: CPT | Performed by: INTERNAL MEDICINE

## 2023-10-11 PROCEDURE — 93010 ELECTROCARDIOGRAM REPORT: CPT | Performed by: INTERNAL MEDICINE

## 2023-10-11 PROCEDURE — G8400 PT W/DXA NO RESULTS DOC: HCPCS | Performed by: INTERNAL MEDICINE

## 2023-10-11 RX ORDER — AMLODIPINE BESYLATE 2.5 MG/1
2.5 TABLET ORAL DAILY
Qty: 30 TABLET | Refills: 5 | Status: SHIPPED | OUTPATIENT
Start: 2023-10-11

## 2023-10-11 NOTE — PROGRESS NOTES
Today's Date: 10/11/2023  Patient's Name: Dahlia Kim  Patient's age: 78 y.o., 1945    Subjective:  Dahlia Kim is being seen in clinic today regarding   Chief Complaint   Patient presents with    Coronary Artery Disease     Follow up post stress test    Medication Refill     furosemide    Shortness of Breath    Atrial Fibrillation         she is here for follow up after stress test which was obtained for symptoms of band like chest pain walking uphill. She reports that since last visit, she had another episode. She also reports dyspnea on moderate exertion. No PND, no syncope or pre-syncope, no orthopnea. Past Medical History:   has a past medical history of Coronary artery disease involving native coronary artery, Dizzy, Gastroesophageal reflux disease without esophagitis, Hypertension, Other hyperlipidemia, Permanent atrial fibrillation ([address]), and Type II or unspecified type diabetes mellitus without mention of complication, not stated as uncontrolled. Past Surgical History:   has a past surgical history that includes Hysterectomy; Cardiac catheterization (04/20/2021); joint replacement; and Colonoscopy. Home Medications:  Prior to Admission medications    Medication Sig Start Date End Date Taking?  Authorizing Provider   diphenhydrAMINE-APAP, sleep, (TYLENOL PM EXTRA STRENGTH)  MG tablet Take 1 tablet by mouth nightly as needed   Yes ProviderBarbara MD   metFORMIN (GLUCOPHAGE) 500 MG tablet  8/23/23  Yes ProviderBarbara MD   atorvastatin (LIPITOR) 40 MG tablet Take 1 tablet by mouth nightly 5/8/23  Yes Miguel Ángel Bryant DO   clopidogrel (PLAVIX) 75 MG tablet Take 1 tablet by mouth daily 5/8/23  Yes Miguel Ángel Bryant DO   JARDIANCE 10 MG tablet  10/11/22  Yes ProviderBarbara MD   furosemide (LASIX) 20 MG tablet Take 1 tablet by mouth daily 11/2/22  Yes Patricia Henry MD   metoprolol tartrate (LOPRESSOR) 25 MG tablet Take 1 tablet by mouth 2 times daily 11/2/22  Yes Ysabel Fernandes

## 2023-10-11 NOTE — PATIENT INSTRUCTIONS
Your Procedure Will Be Scheduled at:      [facility]    [address] [place], [address]   (located across from [address])    Located on the main floor of the [facility]. Report to our reception desk by the [employer]. Parking is free. Handicap parking is available by the main entrance. You may also park in [place] on Level 2 and enter building on the bridge to [facility]. Take elevator to the main floor. Our  will call you to schedule your procedure within a week. If you do not receive a phone call, please call the  directly at [phone] and leave a message, or call [place] office at [phone]. Date:______________________________    Arrival Time:________________________    Procedure Time:_____________________    Instructions:_____________________________      Bring Photo I.D. and insurance cards. Bring all Medications in the bottles. Pack an overnight bag in case you are required to spend the night. You will need someone to drive you home. The  will instruct you on holding food and drink the night before or morning of your procedure. You are to take your Medications, along with your Cardiac and/or Blood Pressure Medications, with small sips of water on the morning of your Procedure, unless instructed otherwise by your Physician. If you need additional directions please call [phone]. If you have any questions please feel free to call the [address] [place], [place] [address] office at [phone].

## 2023-10-16 DIAGNOSIS — I48.21 PERMANENT ATRIAL FIBRILLATION (HCC): ICD-10-CM

## 2023-10-16 DIAGNOSIS — I10 HYPERTENSION, ESSENTIAL: ICD-10-CM

## 2023-10-16 DIAGNOSIS — I25.118 CORONARY ARTERY DISEASE INVOLVING NATIVE CORONARY ARTERY OF NATIVE HEART WITH OTHER FORM OF ANGINA PECTORIS (HCC): ICD-10-CM

## 2023-10-16 RX ORDER — AMLODIPINE BESYLATE 2.5 MG/1
2.5 TABLET ORAL DAILY
Qty: 90 TABLET | Refills: 3 | Status: SHIPPED | OUTPATIENT
Start: 2023-10-16

## 2023-10-23 ENCOUNTER — TELEPHONE (OUTPATIENT)
Dept: CARDIOLOGY | Age: 78
End: 2023-10-23

## 2023-10-23 NOTE — TELEPHONE ENCOUNTER
Pt called stating the amlodipine was dragging her down so much that all she did was sleep. Pt stopped taking the medication Friday and is feeling better. Any changes needed?     [phone]    Last Appt:  10/11/2023  Next Appt:   3/27/2024  Med verified in Epic

## 2023-10-31 ENCOUNTER — HOSPITAL ENCOUNTER (OUTPATIENT)
Age: 78
Setting detail: OUTPATIENT SURGERY
Discharge: HOME | End: 2023-10-31
Attending: INTERNAL MEDICINE | Admitting: INTERNAL MEDICINE
Payer: MEDICARE

## 2023-10-31 VITALS
HEART RATE: 57 BPM | HEIGHT: 67 IN | OXYGEN SATURATION: 97 % | WEIGHT: 219.8 LBS | DIASTOLIC BLOOD PRESSURE: 57 MMHG | TEMPERATURE: 97.8 F | SYSTOLIC BLOOD PRESSURE: 127 MMHG | BODY MASS INDEX: 34.5 KG/M2 | RESPIRATION RATE: 21 BRPM

## 2023-10-31 DIAGNOSIS — R94.39 ABNORMAL STRESS TEST: ICD-10-CM

## 2023-10-31 LAB
ECHO BSA: 2.17 M2
GLUCOSE BLD-MCNC: 135 MG/DL (ref 74–100)

## 2023-10-31 PROCEDURE — 6360000002 HC RX W HCPCS: Performed by: INTERNAL MEDICINE

## 2023-10-31 PROCEDURE — 6370000000 HC RX 637 (ALT 250 FOR IP): Performed by: INTERNAL MEDICINE

## 2023-10-31 PROCEDURE — 82947 ASSAY GLUCOSE BLOOD QUANT: CPT

## 2023-10-31 PROCEDURE — 99153 MOD SED SAME PHYS/QHP EA: CPT | Performed by: INTERNAL MEDICINE

## 2023-10-31 PROCEDURE — 93458 L HRT ARTERY/VENTRICLE ANGIO: CPT | Performed by: INTERNAL MEDICINE

## 2023-10-31 PROCEDURE — 92978 ENDOLUMINL IVUS OCT C 1ST: CPT | Performed by: INTERNAL MEDICINE

## 2023-10-31 PROCEDURE — 92921 PR PRQ TRLUML CORONARY ANGIOPLASTY ADDL BRANCH: CPT | Performed by: INTERNAL MEDICINE

## 2023-10-31 PROCEDURE — C1894 INTRO/SHEATH, NON-LASER: HCPCS | Performed by: INTERNAL MEDICINE

## 2023-10-31 PROCEDURE — 2500000003 HC RX 250 WO HCPCS: Performed by: INTERNAL MEDICINE

## 2023-10-31 PROCEDURE — C1769 GUIDE WIRE: HCPCS | Performed by: INTERNAL MEDICINE

## 2023-10-31 PROCEDURE — 2580000003 HC RX 258: Performed by: INTERNAL MEDICINE

## 2023-10-31 PROCEDURE — 7100000010 HC PHASE II RECOVERY - FIRST 15 MIN: Performed by: INTERNAL MEDICINE

## 2023-10-31 PROCEDURE — 2709999900 HC NON-CHARGEABLE SUPPLY: Performed by: INTERNAL MEDICINE

## 2023-10-31 PROCEDURE — 6360000004 HC RX CONTRAST MEDICATION: Performed by: INTERNAL MEDICINE

## 2023-10-31 PROCEDURE — C1725 CATH, TRANSLUMIN NON-LASER: HCPCS | Performed by: INTERNAL MEDICINE

## 2023-10-31 PROCEDURE — 99152 MOD SED SAME PHYS/QHP 5/>YRS: CPT | Performed by: INTERNAL MEDICINE

## 2023-10-31 PROCEDURE — 92920 PRQ TRLUML C ANGIOP 1ART&/BR: CPT | Performed by: INTERNAL MEDICINE

## 2023-10-31 PROCEDURE — 7100000011 HC PHASE II RECOVERY - ADDTL 15 MIN: Performed by: INTERNAL MEDICINE

## 2023-10-31 RX ORDER — SODIUM CHLORIDE 0.9 % (FLUSH) 0.9 %
5-40 SYRINGE (ML) INJECTION EVERY 12 HOURS SCHEDULED
Status: CANCELLED | OUTPATIENT
Start: 2023-10-31

## 2023-10-31 RX ORDER — CLOPIDOGREL 300 MG/1
TABLET, FILM COATED ORAL PRN
Status: DISCONTINUED | OUTPATIENT
Start: 2023-10-31 | End: 2023-10-31 | Stop reason: HOSPADM

## 2023-10-31 RX ORDER — HEPARIN SODIUM 1000 [USP'U]/ML
INJECTION, SOLUTION INTRAVENOUS; SUBCUTANEOUS PRN
Status: DISCONTINUED | OUTPATIENT
Start: 2023-10-31 | End: 2023-10-31 | Stop reason: HOSPADM

## 2023-10-31 RX ORDER — BIVALIRUDIN 250 MG/5ML
INJECTION, POWDER, LYOPHILIZED, FOR SOLUTION INTRAVENOUS PRN
Status: DISCONTINUED | OUTPATIENT
Start: 2023-10-31 | End: 2023-10-31 | Stop reason: HOSPADM

## 2023-10-31 RX ORDER — ACETAMINOPHEN 325 MG/1
650 TABLET ORAL EVERY 4 HOURS PRN
Status: CANCELLED | OUTPATIENT
Start: 2023-10-31

## 2023-10-31 RX ORDER — SODIUM CHLORIDE 9 MG/ML
INJECTION, SOLUTION INTRAVENOUS PRN
Status: CANCELLED | OUTPATIENT
Start: 2023-10-31

## 2023-10-31 RX ORDER — ASPIRIN 325 MG
TABLET ORAL PRN
Status: DISCONTINUED | OUTPATIENT
Start: 2023-10-31 | End: 2023-10-31 | Stop reason: HOSPADM

## 2023-10-31 RX ORDER — FENTANYL CITRATE 50 UG/ML
INJECTION, SOLUTION INTRAMUSCULAR; INTRAVENOUS PRN
Status: DISCONTINUED | OUTPATIENT
Start: 2023-10-31 | End: 2023-10-31 | Stop reason: HOSPADM

## 2023-10-31 RX ORDER — SODIUM CHLORIDE 0.9 % (FLUSH) 0.9 %
5-40 SYRINGE (ML) INJECTION PRN
Status: CANCELLED | OUTPATIENT
Start: 2023-10-31

## 2023-10-31 RX ORDER — NITROGLYCERIN 20 MG/100ML
INJECTION INTRAVENOUS PRN
Status: DISCONTINUED | OUTPATIENT
Start: 2023-10-31 | End: 2023-10-31 | Stop reason: HOSPADM

## 2023-10-31 RX ORDER — SODIUM CHLORIDE 9 MG/ML
INJECTION, SOLUTION INTRAVENOUS CONTINUOUS
Status: DISCONTINUED | OUTPATIENT
Start: 2023-10-31 | End: 2023-10-31 | Stop reason: HOSPADM

## 2023-10-31 RX ORDER — ASPIRIN 81 MG/1
81 TABLET ORAL DAILY
Qty: 30 TABLET | Refills: 0 | Status: SHIPPED | OUTPATIENT
Start: 2023-10-31 | End: 2023-11-30

## 2023-10-31 RX ORDER — MIDAZOLAM HYDROCHLORIDE 1 MG/ML
INJECTION INTRAMUSCULAR; INTRAVENOUS PRN
Status: DISCONTINUED | OUTPATIENT
Start: 2023-10-31 | End: 2023-10-31 | Stop reason: HOSPADM

## 2023-10-31 RX ADMIN — SODIUM CHLORIDE: 9 INJECTION, SOLUTION INTRAVENOUS at 10:11

## 2023-10-31 NOTE — DISCHARGE INSTRUCTIONS
symptoms such as chest pain or discomfort. If blood flow is completely blocked, you could have a heart attack. You can slow CAD and reduce the risk of future problems by making changes in your lifestyle. These include quitting smoking and eating heart-healthy foods. Treatments for CAD, along with changes in your lifestyle, can help you live a longer and healthier life. How can you prevent coronary artery disease? Do not smoke. It may be the best thing you can do to prevent heart disease. If you need help quitting, talk to your doctor about stop-smoking programs and medicines. These can increase your chances of quitting for good. Be active. Get at least 30 minutes of exercise on most days of the week. Walking is a good choice. You also may want to do other activities, such as running, swimming, cycling, or playing tennis or team sports. Eat heart-healthy foods. Eat more fruits and vegetables and less foods that contain saturated and trans fats. Limit alcohol, sodium, and sweets. Stay at a healthy weight. Lose weight if you need to. Manage other health problems such as diabetes, high blood pressure, and high cholesterol. Talk to your doctor about taking a daily aspirin. Manage stress. Stress can hurt your heart. To keep stress low, talk about your problems and feelings. Don't keep your feelings hidden. How is coronary artery disease treated? Your doctor will suggest that you make lifestyle changes. For example, your doctor may ask you to eat healthy foods, quit smoking, lose extra weight, and be more active. You will have to take medicines. Your doctor may suggest a procedure to open narrowed or blocked arteries. This is called angioplasty. Or your doctor may suggest using healthy blood vessels to create detours around narrowed or blocked arteries. This is called bypass surgery. Follow-up care is a key part of your treatment and safety.  Be sure to make and go to all appointments, and call your doctor if

## 2023-10-31 NOTE — PROGRESS NOTES
Ambulated to bathroom and in halls. Gait steady. . Right RADIAL puncture site and  assessment unchanged.

## 2023-10-31 NOTE — H&P
[facility]  Procedure History and Physical Update          Patient Name: Dahlia Kim  MRN:    [id number]  YOB: 1945  Date of evaluation:  10/31/2023    Procedure:    Cardiac cath +/- PCI    Indication for procedure:  Abnormal stress test      Please refer to the office note completed by Dr. Ruby Lovett MD on 10/11/2023 in the medical record and note that:    [x] I have examined the patient and reviewed the H&P/Consult and there are no changes to be made to the assessment or plan. [] I have examined the patient and reviewed the H&P/Consult and have noted the following changes:    Past Medical History:   Diagnosis Date    Coronary artery disease involving native coronary artery     post stenting    Dizzy     Gastroesophageal reflux disease without esophagitis     Hypertension     Other hyperlipidemia     Permanent atrial fibrillation (HCC)     Type II or unspecified type diabetes mellitus without mention of complication, not stated as uncontrolled        Past Surgical History:   Procedure Laterality Date    CARDIAC CATHETERIZATION  04/20/2021    CARDIAC CATHETERIZATION  10/31/2023    COLONOSCOPY      HYSTERECTOMY (CERVIX STATUS UNKNOWN)      JOINT REPLACEMENT      hip x2 knee x2       Family History   Problem Relation Age of Onset    Other Mother         detached retina ou    Glaucoma Neg Hx     Cataracts Neg Hx     Diabetes Neg Hx        No Known Allergies    Prior to Admission medications    Medication Sig Start Date End Date Taking?  Authorizing Provider   metoprolol tartrate (LOPRESSOR) 25 MG tablet Take 1 tablet by mouth 2 times daily 10/16/23   Princess Tamiko Rizzo APRN - CNP   amLODIPine (NORVASC) 2.5 MG tablet Take 1 tablet by mouth daily 10/16/23   Princess Tamiko Rizzo APRN - CNP   diphenhydrAMINE-APAP, sleep, (TYLENOL PM EXTRA STRENGTH)  MG tablet Take 1 tablet by mouth nightly as needed    Provider, MD Barbara   metFORMIN (GLUCOPHAGE) 500 MG tablet  8/23/23

## 2023-10-31 NOTE — PROGRESS NOTES
Patient admitted, consent signed and questions answered. Patient ready for procedure. Call light to reach with side rails up 2 of 2. Bilat groin clipped with writer and Joycelyn Clayton  present. Family at bedside with patient. History and physical completed.

## 2023-10-31 NOTE — PROGRESS NOTES
Received post procedure to [facility] to room 11. Assessment obtained. Restrictions reviewed with patient. Post procedure pathway initiated. Right radial site site soft, Vasc dry and intact. No hematoma noted. Family at side. Patient without complaints.

## 2023-11-01 NOTE — PROGRESS NOTES
All discharge instructions explained to pt and daughter. All questions answered and paper signed.  Discharged per wheelchair home

## 2023-11-29 ENCOUNTER — OFFICE VISIT (OUTPATIENT)
Dept: CARDIOLOGY | Age: 78
End: 2023-11-29
Payer: MEDICARE

## 2023-11-29 VITALS — SYSTOLIC BLOOD PRESSURE: 106 MMHG | DIASTOLIC BLOOD PRESSURE: 60 MMHG | HEART RATE: 73 BPM

## 2023-11-29 DIAGNOSIS — I48.21 PERMANENT ATRIAL FIBRILLATION (HCC): Primary | ICD-10-CM

## 2023-11-29 PROCEDURE — G8428 CUR MEDS NOT DOCUMENT: HCPCS | Performed by: NURSE PRACTITIONER

## 2023-11-29 PROCEDURE — G8400 PT W/DXA NO RESULTS DOC: HCPCS | Performed by: NURSE PRACTITIONER

## 2023-11-29 PROCEDURE — 1123F ACP DISCUSS/DSCN MKR DOCD: CPT | Performed by: NURSE PRACTITIONER

## 2023-11-29 PROCEDURE — G8417 CALC BMI ABV UP PARAM F/U: HCPCS | Performed by: NURSE PRACTITIONER

## 2023-11-29 PROCEDURE — 93010 ELECTROCARDIOGRAM REPORT: CPT | Performed by: NURSE PRACTITIONER

## 2023-11-29 PROCEDURE — 1036F TOBACCO NON-USER: CPT | Performed by: NURSE PRACTITIONER

## 2023-11-29 PROCEDURE — 99214 OFFICE O/P EST MOD 30 MIN: CPT | Performed by: NURSE PRACTITIONER

## 2023-11-29 PROCEDURE — 93005 ELECTROCARDIOGRAM TRACING: CPT | Performed by: NURSE PRACTITIONER

## 2023-11-29 PROCEDURE — 1090F PRES/ABSN URINE INCON ASSESS: CPT | Performed by: NURSE PRACTITIONER

## 2023-11-29 PROCEDURE — G8484 FLU IMMUNIZE NO ADMIN: HCPCS | Performed by: NURSE PRACTITIONER

## 2023-11-29 PROCEDURE — 3078F DIAST BP <80 MM HG: CPT | Performed by: NURSE PRACTITIONER

## 2023-11-29 PROCEDURE — 99212 OFFICE O/P EST SF 10 MIN: CPT | Performed by: NURSE PRACTITIONER

## 2023-11-29 PROCEDURE — 3074F SYST BP LT 130 MM HG: CPT | Performed by: NURSE PRACTITIONER

## 2023-11-29 RX ORDER — ASPIRIN 81 MG/1
81 TABLET ORAL DAILY
Qty: 30 TABLET | Refills: 0 | Status: SHIPPED | OUTPATIENT
Start: 2023-11-29 | End: 2023-12-29

## 2023-11-29 NOTE — PROGRESS NOTES
mucosa  Respiratory:  Normal excursion and expansion without use of accessory muscles  Resp Auscultation: Good respiratory effort. No for increased work of breathing. On auscultation: clear to auscultation bilaterally  Cardiovascular:  irregular S1 and S2.  Jugular venous pulsation Normal  The carotid upstroke is normal in amplitude and contour without delay or bruit   Abdomen:   soft  Bowel sounds present  Extremities:   No edema  Neurological:  Alert and oriented. Cardiac Data:  EKG: Atrial fibrillation    Labs:     CBC: No results for input(s): \"WBC\", \"HGB\", \"HCT\", \"PLT\" in the last 72 hours. BMP: No results for input(s): \"NA\", \"K\", \"CO2\", \"BUN\", \"CREATININE\", \"LABGLOM\", \"GLUCOSE\" in the last 72 hours. PT/INR: No results for input(s): \"PROTIME\", \"INR\" in the last 72 hours. FASTING LIPID PANEL:No results found for: \"HDL\", \"LDLDIRECT\", \"LDLCALC\", \"TRIG\"  LIVER PROFILE:No results for input(s): \"AST\", \"ALT\", \"LABALBU\" in the last 72 hours. Problem List:  Patient Active Problem List   Diagnosis    Background diabetic retinopathy ([address])    Cataract    Myopia of both eyes with astigmatism and presbyopia    Dizziness    Vertigo    Balance problem    Peripheral polyneuropathy    Diabetes mellitus type II, controlled ([address])    Atrial fibrillation ([address])    Current use of long term anticoagulation    Cerebral ischemia    Hyperlipidemia    GERD (gastroesophageal reflux disease)    Essential hypertension    Arthritis of knee    Carotid stenosis, asymptomatic, right    VBI (vertebrobasilar insufficiency)    S/P angioplasty with stent    Abnormal stress test        TTE 4/8/21  Summary  Normal left ventricular diameter. Borderline left ventricular hypertrophy. Left ventricular systolic function is normal. Left ventricular ejection  fraction 65 %. Evidence of diastolic dysfunction. Left atrium is mildly dilated. Right atrial dilatation. Right ventricular dilatation with normal systolic function.   Aortic valve sclerosis

## 2024-01-26 NOTE — TELEPHONE ENCOUNTER
Spoke with pt. Relayed instructions below and she accepts. She will draw INR today at [address] where she has a standing order with her PCP Dr Brennon Ramirez. I asked her to draw INR this morning and contact Dr Brennon Ramirez office by 2pm today for results, and she agrees. no...

## 2024-04-08 NOTE — PROGRESS NOTES
Rescheduled: Yes,   Procedure: Lower Endoscopy [Colonoscopy]    Date: 6/28/24   Location: [facility]; [address], [place] [address]    Surgeon: Ginette   Sedation Level Scheduled  CS ,  Reason for Sedation Level Protocol   Instructions updated and sent: Yes     Does patient need PAC or Pre -Op Rescheduled? : N       Did you cancel or rescheduled an EUS procedure? No.        Kwesi Lance presents today for   Chief Complaint   Patient presents with    Blurred Vision    Ophth Diabetic Exam    Vision Exam    Dizziness   . HPI     Last Vision Exam: 11/26/2019 Aw  Last Ophthalmology Exam: n/a  Last Filled Glasses Rx: 11/26/2019  Insurance: Medicare  Update: Dm Exam; Glasses  Distance and reading are getting more blurry  Started having dizzy spells in February 2021 and has seen the neurologist who said everything is fine ; also saw the cardiologist and had 2 stents put in. When watching the tv and she has a picture window to the side of her and she turns to look out of it she does get motion sick and vision starts to spin. Has a hard time reading for long periods of time, when she adjust her glasses and moves them up vision does clear up. Diabetic:  Sugars: does not need to check    HmgA1C: 5.6  2/2021             Current Outpatient Medications   Medication Sig Dispense Refill    atorvastatin (LIPITOR) 40 MG tablet Take 1 tablet by mouth nightly 90 tablet 3    clopidogrel (PLAVIX) 75 MG tablet Take 1 tablet by mouth daily 90 tablet 3    aspirin 81 MG chewable tablet Take 1 tablet by mouth daily 30 tablet 3    warfarin (COUMADIN) 2 MG tablet Take 2 mg by mouth As directed      glipiZIDE (GLUCOTROL XL) 5 MG extended release tablet       carvedilol (COREG) 6.25 MG tablet Take by mouth 2 times daily       famotidine (PEPCID) 20 MG tablet Take 20 mg by mouth daily       furosemide (LASIX) 20 MG tablet Take 20 mg by mouth daily       metFORMIN (GLUCOPHAGE) 1000 MG tablet Take 1,000 mg by mouth 2 times daily (with meals)       warfarin (COUMADIN) 2.5 MG tablet Take 2.5 mg by mouth daily \"5 days a week\"       No current facility-administered medications for this visit.      ROS     Positive for: Endocrine    Negative for: Constitutional, Gastrointestinal, Neurological, Skin, Genitourinary, Musculoskeletal, HENT, Cardiovascular, Eyes, Respiratory, Psychiatric, Allergic/Imm, Heme/Lymph          Family History   Problem Relation Age of Onset    Other Mother         detached retina ou    Glaucoma Neg Hx     Cataracts Neg Hx     Diabetes Neg Hx      Social History     Socioeconomic History    Marital status:      Spouse name: None    Number of children: None    Years of education: None    Highest education level: None   Occupational History    None   Tobacco Use    Smoking status: Never Smoker    Smokeless tobacco: Never Used   Vaping Use    Vaping Use: Never used   Substance and Sexual Activity    Alcohol use: Yes     Comment: social    Drug use: Never    Sexual activity: None   Other Topics Concern    None   Social History Narrative    None     Social Determinants of Health     Financial Resource Strain:     Difficulty of Paying Living Expenses:    Food Insecurity:     Worried About Running Out of Food in the Last Year:     Ran Out of Food in the Last Year:    Transportation Needs:     Lack of Transportation (Medical):      Lack of Transportation (Non-Medical):    Physical Activity:     Days of Exercise per Week:     Minutes of Exercise per Session:    Stress:     Feeling of Stress :    Social Connections:     Frequency of Communication with Friends and Family:     Frequency of Social Gatherings with Friends and Family:     Attends Yarsanism Services:     Active Member of Clubs or Organizations:     Attends Club or Organization Meetings:     Marital Status:    Intimate Partner Violence:     Fear of Current or Ex-Partner:     Emotionally Abused:     Physically Abused:     Sexually Abused:        Past Medical History:   Diagnosis Date    Dizzy     Hypertension     Type II or unspecified type diabetes mellitus without mention of complication, not stated as uncontrolled          Main Ophthalmology Exam     External Exam       Right Left    External Normal Normal          Slit Lamp Exam       Right Left    Lids/Lashes Normal Normal Diplopia    4. Nuclear sclerosis of both eyes        PLAN:    Discussed the patient's diagnosis of diabetes and the impact this can have on their ocular health, potentially even leading to permanent blindness. I discussed with the patient the importance of continued follow-up and management with their primary care physician to control their glycemic, blood pressure, and lipid levels. The patient verbalized understanding. 2.  New glasses  3. We will add prism to the glasses to help with double vision;  Return if any problems persist.  (Has had prism in past but not in current glasses )   4. Monitor for future progression and visual significance. Counseled patient that more glare may be noticed and more light may be needed for reading.         Glycemic control as per PCP   Patient Instructions   New glasses recommended    Keep yearly appointments because of diabetes          Return in about 1 year (around 6/15/2022) for complete eye exam.

## 2024-06-12 ENCOUNTER — OFFICE VISIT (OUTPATIENT)
Dept: CARDIOLOGY | Age: 79
End: 2024-06-12
Payer: MEDICARE

## 2024-06-12 VITALS
SYSTOLIC BLOOD PRESSURE: 124 MMHG | HEIGHT: 67 IN | DIASTOLIC BLOOD PRESSURE: 74 MMHG | HEART RATE: 62 BPM | WEIGHT: 223 LBS | BODY MASS INDEX: 35 KG/M2

## 2024-06-12 DIAGNOSIS — I10 ESSENTIAL HYPERTENSION: ICD-10-CM

## 2024-06-12 DIAGNOSIS — I25.10 CORONARY ARTERY DISEASE INVOLVING NATIVE CORONARY ARTERY OF NATIVE HEART WITHOUT ANGINA PECTORIS: ICD-10-CM

## 2024-06-12 DIAGNOSIS — I48.21 PERMANENT ATRIAL FIBRILLATION (HCC): Primary | ICD-10-CM

## 2024-06-12 DIAGNOSIS — I65.29 STENOSIS OF CAROTID ARTERY, UNSPECIFIED LATERALITY: ICD-10-CM

## 2024-06-12 DIAGNOSIS — Z98.61 CORONARY ANGIOPLASTY STATUS: ICD-10-CM

## 2024-06-12 DIAGNOSIS — E78.2 MIXED HYPERLIPIDEMIA: ICD-10-CM

## 2024-06-12 DIAGNOSIS — I10 HYPERTENSION, ESSENTIAL: ICD-10-CM

## 2024-06-12 PROCEDURE — G8417 CALC BMI ABV UP PARAM F/U: HCPCS | Performed by: INTERNAL MEDICINE

## 2024-06-12 PROCEDURE — 1090F PRES/ABSN URINE INCON ASSESS: CPT | Performed by: INTERNAL MEDICINE

## 2024-06-12 PROCEDURE — 99212 OFFICE O/P EST SF 10 MIN: CPT | Performed by: INTERNAL MEDICINE

## 2024-06-12 PROCEDURE — 3078F DIAST BP <80 MM HG: CPT | Performed by: INTERNAL MEDICINE

## 2024-06-12 PROCEDURE — 93005 ELECTROCARDIOGRAM TRACING: CPT | Performed by: INTERNAL MEDICINE

## 2024-06-12 PROCEDURE — G8427 DOCREV CUR MEDS BY ELIG CLIN: HCPCS | Performed by: INTERNAL MEDICINE

## 2024-06-12 PROCEDURE — 1123F ACP DISCUSS/DSCN MKR DOCD: CPT | Performed by: INTERNAL MEDICINE

## 2024-06-12 PROCEDURE — 99214 OFFICE O/P EST MOD 30 MIN: CPT | Performed by: INTERNAL MEDICINE

## 2024-06-12 PROCEDURE — 3074F SYST BP LT 130 MM HG: CPT | Performed by: INTERNAL MEDICINE

## 2024-06-12 PROCEDURE — G8400 PT W/DXA NO RESULTS DOC: HCPCS | Performed by: INTERNAL MEDICINE

## 2024-06-12 PROCEDURE — 93010 ELECTROCARDIOGRAM REPORT: CPT | Performed by: INTERNAL MEDICINE

## 2024-06-12 PROCEDURE — 1036F TOBACCO NON-USER: CPT | Performed by: INTERNAL MEDICINE

## 2024-06-12 RX ORDER — ISOSORBIDE MONONITRATE 30 MG/1
30 TABLET, EXTENDED RELEASE ORAL DAILY
Qty: 90 TABLET | Refills: 3 | Status: SHIPPED | OUTPATIENT
Start: 2024-06-12

## 2024-06-12 NOTE — PROGRESS NOTES
increased work of breathing. On auscultation: clear to auscultation bilaterally  Cardiovascular:  irregular S1 and S2.  Jugular venous pulsation Normal  The carotid upstroke is normal in amplitude and contour without delay or bruit   Abdomen:   soft  Bowel sounds present  Extremities:   No edema  Neurological:  Alert and oriented.    Cardiac Data:  EKG: Atrial fibrillation   -Poor R-wave progression -may be secondary to pulmonary disease  consider old anterior infarct.  Low voltage -possible pulmonary disease.    TTE 4/8/21  Summary  Normal left ventricular diameter.  Borderline left ventricular hypertrophy.  Left ventricular systolic function is normal. Left ventricular ejection  fraction 65 %.  Evidence of diastolic dysfunction.  Left atrium is mildly dilated. Right atrial dilatation.  Right ventricular dilatation with normal systolic function.  Aortic valve sclerosis without stenosis.  Mild aortic insufficiency.  Mitral annular calcification.  Mitral Valve Prolapse of Anterior MV leaflet.  Mild to moderate mitral regurgitation.  Mild tricuspid regurgitation. Estimated right ventricular systolic pressure  is 35 mmHg.     Cardiac cath 4/20/21   One vessel coronary artery disease.   Successful bifurcation PCI of mid LAD and D1 using VERONICA.     TTE 2/18/22-LVEF 70%. 1+LVH, severely dilated LA, mildly dilated RA, mild to moderate MR/TR, RVSP 38 mm Hg.    Stress test 10/2/23    Stress Combined Conclusion: The study is positive for myocardial ischemia and is negative for myocardial infarction. Findings suggest a low risk of cardiac events, due to small size and mild intensity of ischemia.    Stress Function: Left ventricular function post-stress is normal. Post-stress ejection fraction is 69%.    Perfusion Comments: Prone images were not obtained. LV perfusion is abnormal.    Perfusion Defect: There is a mild severity left ventricular stress perfusion defect that is small in size present in the apex segment(s) that is

## 2024-09-18 DIAGNOSIS — I48.21 PERMANENT ATRIAL FIBRILLATION (HCC): ICD-10-CM

## 2024-09-18 DIAGNOSIS — I10 HYPERTENSION, ESSENTIAL: ICD-10-CM

## 2024-09-18 RX ORDER — METOPROLOL TARTRATE 25 MG/1
25 TABLET, FILM COATED ORAL 2 TIMES DAILY
Qty: 180 TABLET | Refills: 3 | Status: SHIPPED | OUTPATIENT
Start: 2024-09-18

## 2025-01-08 ENCOUNTER — OFFICE VISIT (OUTPATIENT)
Dept: CARDIOLOGY | Age: 80
End: 2025-01-08
Payer: MEDICARE

## 2025-01-08 VITALS
BODY MASS INDEX: 35.47 KG/M2 | HEIGHT: 67 IN | OXYGEN SATURATION: 96 % | DIASTOLIC BLOOD PRESSURE: 80 MMHG | SYSTOLIC BLOOD PRESSURE: 138 MMHG | WEIGHT: 226 LBS | HEART RATE: 65 BPM

## 2025-01-08 DIAGNOSIS — I48.21 PERMANENT ATRIAL FIBRILLATION (HCC): Primary | ICD-10-CM

## 2025-01-08 DIAGNOSIS — I25.10 CORONARY ARTERY DISEASE INVOLVING NATIVE CORONARY ARTERY OF NATIVE HEART WITHOUT ANGINA PECTORIS: ICD-10-CM

## 2025-01-08 DIAGNOSIS — I50.32 CHRONIC DIASTOLIC CHF (CONGESTIVE HEART FAILURE) (HCC): ICD-10-CM

## 2025-01-08 PROCEDURE — 93010 ELECTROCARDIOGRAM REPORT: CPT | Performed by: NURSE PRACTITIONER

## 2025-01-08 PROCEDURE — 1036F TOBACCO NON-USER: CPT | Performed by: NURSE PRACTITIONER

## 2025-01-08 PROCEDURE — 1159F MED LIST DOCD IN RCRD: CPT | Performed by: NURSE PRACTITIONER

## 2025-01-08 PROCEDURE — 1123F ACP DISCUSS/DSCN MKR DOCD: CPT | Performed by: NURSE PRACTITIONER

## 2025-01-08 PROCEDURE — G8400 PT W/DXA NO RESULTS DOC: HCPCS | Performed by: NURSE PRACTITIONER

## 2025-01-08 PROCEDURE — 3079F DIAST BP 80-89 MM HG: CPT | Performed by: NURSE PRACTITIONER

## 2025-01-08 PROCEDURE — 1126F AMNT PAIN NOTED NONE PRSNT: CPT | Performed by: NURSE PRACTITIONER

## 2025-01-08 PROCEDURE — 3075F SYST BP GE 130 - 139MM HG: CPT | Performed by: NURSE PRACTITIONER

## 2025-01-08 PROCEDURE — M1308 PR FLU IMMUNIZE NO ADMIN: HCPCS | Performed by: NURSE PRACTITIONER

## 2025-01-08 PROCEDURE — 99214 OFFICE O/P EST MOD 30 MIN: CPT | Performed by: NURSE PRACTITIONER

## 2025-01-08 PROCEDURE — G8427 DOCREV CUR MEDS BY ELIG CLIN: HCPCS | Performed by: NURSE PRACTITIONER

## 2025-01-08 PROCEDURE — 93005 ELECTROCARDIOGRAM TRACING: CPT | Performed by: NURSE PRACTITIONER

## 2025-01-08 PROCEDURE — 99212 OFFICE O/P EST SF 10 MIN: CPT | Performed by: NURSE PRACTITIONER

## 2025-01-08 PROCEDURE — 1090F PRES/ABSN URINE INCON ASSESS: CPT | Performed by: NURSE PRACTITIONER

## 2025-01-08 PROCEDURE — G8417 CALC BMI ABV UP PARAM F/U: HCPCS | Performed by: NURSE PRACTITIONER

## 2025-01-08 RX ORDER — BUDESONIDE AND FORMOTEROL FUMARATE DIHYDRATE 160; 4.5 UG/1; UG/1
2 AEROSOL RESPIRATORY (INHALATION) 2 TIMES DAILY
COMMUNITY
Start: 2025-01-06 | End: 2025-07-05

## 2025-01-08 RX ORDER — BENZONATATE 200 MG/1
200 CAPSULE ORAL 3 TIMES DAILY PRN
COMMUNITY
Start: 2025-01-02 | End: 2025-01-12

## 2025-01-08 NOTE — PROGRESS NOTES
Cardiology Consultation/Follow up.     Today's Date: 1/8/2025  Patient's Name: Nikole Jarvis  Patient's age: 79 y.o., 1945    CC: follow up    Subjective:  Nikole Jarvis is being seen in clinic today for follow up.   Most of the time feels well.   Still has some WOODS, not changed\, chronic   No palpitations  No le edema  No chest pain  No syncope.   Did have a recent fall.  States that she just lost her balance.        Past Medical History:   has a past medical history of Coronary artery disease involving native coronary artery, Dizzy, Gastroesophageal reflux disease without esophagitis, Hypertension, Other hyperlipidemia, Permanent atrial fibrillation (HCC), and Type II or unspecified type diabetes mellitus without mention of complication, not stated as uncontrolled.    Past Surgical History:   has a past surgical history that includes Hysterectomy; Cardiac catheterization (04/20/2021); joint replacement; Colonoscopy; Cardiac catheterization (10/31/2023); Cardiac procedure (N/A, 10/31/2023); Cardiac procedure (N/A, 10/31/2023); and Cardiac procedure (N/A, 10/31/2023).    Home Medications:  Prior to Admission medications    Medication Sig Start Date End Date Taking? Authorizing Provider   metoprolol tartrate (LOPRESSOR) 25 MG tablet TAKE 1 TABLET TWICE A DAY 9/18/24   Karen Blas APRN - CNP   isosorbide mononitrate (IMDUR) 30 MG extended release tablet Take 1 tablet by mouth daily 6/12/24   Miguel Ángel Bryant DO   atorvastatin (LIPITOR) 40 MG tablet TAKE 1 TABLET NIGHTLY 3/22/24   Miguel Ángel Bryant DO   clopidogrel (PLAVIX) 75 MG tablet TAKE 1 TABLET DAILY 3/22/24   Miguel Ángel Bryant DO   aspirin 81 MG EC tablet Take 1 tablet by mouth daily 11/29/23 6/12/24  Karen Blas APRN - CNP   diphenhydrAMINE-APAP, sleep, (TYLENOL PM EXTRA STRENGTH)  MG tablet Take 1 tablet by mouth nightly as needed    Provider, MD Barbara   metFORMIN (GLUCOPHAGE) 500 MG tablet  8/23/23   Provider, Historical, MD   JARDIANCE

## 2025-07-23 ENCOUNTER — OFFICE VISIT (OUTPATIENT)
Dept: CARDIOLOGY | Age: 80
End: 2025-07-23
Payer: MEDICARE

## 2025-07-23 VITALS
HEIGHT: 67 IN | BODY MASS INDEX: 35.16 KG/M2 | SYSTOLIC BLOOD PRESSURE: 120 MMHG | WEIGHT: 224 LBS | DIASTOLIC BLOOD PRESSURE: 70 MMHG

## 2025-07-23 DIAGNOSIS — I25.10 CORONARY ARTERY DISEASE INVOLVING NATIVE CORONARY ARTERY OF NATIVE HEART WITHOUT ANGINA PECTORIS: ICD-10-CM

## 2025-07-23 DIAGNOSIS — I10 HYPERTENSION, ESSENTIAL: ICD-10-CM

## 2025-07-23 DIAGNOSIS — I50.32 CHRONIC DIASTOLIC CHF (CONGESTIVE HEART FAILURE) (HCC): ICD-10-CM

## 2025-07-23 DIAGNOSIS — I48.21 PERMANENT ATRIAL FIBRILLATION (HCC): Primary | ICD-10-CM

## 2025-07-23 PROCEDURE — 3078F DIAST BP <80 MM HG: CPT | Performed by: INTERNAL MEDICINE

## 2025-07-23 PROCEDURE — 99213 OFFICE O/P EST LOW 20 MIN: CPT | Performed by: INTERNAL MEDICINE

## 2025-07-23 PROCEDURE — 1090F PRES/ABSN URINE INCON ASSESS: CPT | Performed by: INTERNAL MEDICINE

## 2025-07-23 PROCEDURE — G8417 CALC BMI ABV UP PARAM F/U: HCPCS | Performed by: INTERNAL MEDICINE

## 2025-07-23 PROCEDURE — 93005 ELECTROCARDIOGRAM TRACING: CPT | Performed by: INTERNAL MEDICINE

## 2025-07-23 PROCEDURE — 1123F ACP DISCUSS/DSCN MKR DOCD: CPT | Performed by: INTERNAL MEDICINE

## 2025-07-23 PROCEDURE — 99214 OFFICE O/P EST MOD 30 MIN: CPT | Performed by: INTERNAL MEDICINE

## 2025-07-23 PROCEDURE — 93010 ELECTROCARDIOGRAM REPORT: CPT | Performed by: INTERNAL MEDICINE

## 2025-07-23 PROCEDURE — G8400 PT W/DXA NO RESULTS DOC: HCPCS | Performed by: INTERNAL MEDICINE

## 2025-07-23 PROCEDURE — G8427 DOCREV CUR MEDS BY ELIG CLIN: HCPCS | Performed by: INTERNAL MEDICINE

## 2025-07-23 PROCEDURE — 3074F SYST BP LT 130 MM HG: CPT | Performed by: INTERNAL MEDICINE

## 2025-07-23 PROCEDURE — 1159F MED LIST DOCD IN RCRD: CPT | Performed by: INTERNAL MEDICINE

## 2025-07-23 PROCEDURE — 1036F TOBACCO NON-USER: CPT | Performed by: INTERNAL MEDICINE

## 2025-07-23 NOTE — PROGRESS NOTES
Today's Date: 7/23/2025  Patient's Name: Nikole Jarvis  Patient's age: 80 y.o., 1945    Subjective:  Nikole Jarvis is being seen in clinic today regarding CAD, HTN, HL    she is doing well from a cardiac standpoint. No chest pain, no dyspnea, no PND, no syncope or pre-syncope, no orthopnea.        Past Medical History:   has a past medical history of Coronary artery disease involving native coronary artery, Dizzy, Gastroesophageal reflux disease without esophagitis, Hypertension, Other hyperlipidemia, Permanent atrial fibrillation (HCC), and Type II or unspecified type diabetes mellitus without mention of complication, not stated as uncontrolled.    Past Surgical History:   has a past surgical history that includes Hysterectomy; Cardiac catheterization (04/20/2021); joint replacement; Colonoscopy; Cardiac catheterization (10/31/2023); Cardiac procedure (N/A, 10/31/2023); Cardiac procedure (N/A, 10/31/2023); and Cardiac procedure (N/A, 10/31/2023).    Home Medications:  Prior to Admission medications    Medication Sig Start Date End Date Taking? Authorizing Provider   clopidogrel (PLAVIX) 75 MG tablet TAKE 1 TABLET DAILY 3/17/25  Yes Miguel Ángel Bryant DO   atorvastatin (LIPITOR) 40 MG tablet TAKE 1 TABLET NIGHTLY 3/17/25  Yes Miguel Ángel Bryant DO   budesonide-formoterol (SYMBICORT) 160-4.5 MCG/ACT AERO Inhale 2 puffs into the lungs 2 times daily 1/6/25 7/23/25 Yes Barbara Jorgensen MD   metoprolol tartrate (LOPRESSOR) 25 MG tablet TAKE 1 TABLET TWICE A DAY 9/18/24  Yes Karen Blas APRN - CNP   isosorbide mononitrate (IMDUR) 30 MG extended release tablet Take 1 tablet by mouth daily 6/12/24  Yes Miguel Ángel Bryant DO   diphenhydrAMINE-APAP, sleep, (TYLENOL PM EXTRA STRENGTH)  MG tablet Take 1 tablet by mouth nightly as needed   Yes Barbara Jorgensen MD   metFORMIN (GLUCOPHAGE) 500 MG tablet Take 1 tablet by mouth 2 times daily (with meals) 8/23/23  Yes Barbara Jorgensen MD   JARDIANCE 10 MG tablet  Pt treated with Adderall 20mg from Lula Angelo MD  No history of hospitalizations, suicide attempts, self injurious behaviors